# Patient Record
Sex: FEMALE | Race: BLACK OR AFRICAN AMERICAN | NOT HISPANIC OR LATINO | Employment: FULL TIME | URBAN - METROPOLITAN AREA
[De-identification: names, ages, dates, MRNs, and addresses within clinical notes are randomized per-mention and may not be internally consistent; named-entity substitution may affect disease eponyms.]

---

## 2017-03-16 ENCOUNTER — HISTORICAL (OUTPATIENT)
Dept: LAB | Facility: HOSPITAL | Age: 27
End: 2017-03-16

## 2017-10-27 ENCOUNTER — HISTORICAL (OUTPATIENT)
Dept: RADIOLOGY | Facility: HOSPITAL | Age: 27
End: 2017-10-27

## 2018-07-30 ENCOUNTER — HISTORICAL (OUTPATIENT)
Dept: LAB | Facility: HOSPITAL | Age: 28
End: 2018-07-30

## 2018-07-30 LAB
ALBUMIN SERPL-MCNC: 3.5 GM/DL (ref 3.4–5)
ALBUMIN/GLOB SERPL: 0.7 RATIO
ALP SERPL-CCNC: 94 UNIT/L (ref 30–113)
ALT SERPL-CCNC: 25 UNIT/L (ref 10–45)
AST SERPL-CCNC: 18 UNIT/L (ref 15–37)
BILIRUB SERPL-MCNC: 0.7 MG/DL (ref 0.1–0.9)
BILIRUBIN DIRECT+TOT PNL SERPL-MCNC: 0.1 MG/DL (ref 0–0.3)
BILIRUBIN DIRECT+TOT PNL SERPL-MCNC: 0.6 MG/DL
BUN SERPL-MCNC: 9 MG/DL (ref 10–20)
CALCIUM SERPL-MCNC: 9.2 MG/DL (ref 8–10.5)
CHLORIDE SERPL-SCNC: 104 MMOL/L (ref 100–108)
CO2 SERPL-SCNC: 26 MMOL/L (ref 21–35)
CREAT SERPL-MCNC: 0.91 MG/DL (ref 0.7–1.3)
EST. AVERAGE GLUCOSE BLD GHB EST-MCNC: 114 MG/DL
GLOBULIN SER-MCNC: 4.7 GM/DL
GLUCOSE SERPL-MCNC: 94 MG/DL (ref 75–116)
HBA1C MFR BLD: 5.6 % (ref 4.8–6)
POTASSIUM SERPL-SCNC: 3.8 MMOL/L (ref 3.6–5.2)
PROT SERPL-MCNC: 8.2 GM/DL (ref 6.4–8.2)
SODIUM SERPL-SCNC: 139 MMOL/L (ref 135–145)

## 2019-10-14 ENCOUNTER — HISTORICAL (OUTPATIENT)
Dept: LAB | Facility: HOSPITAL | Age: 29
End: 2019-10-14

## 2019-10-14 LAB
ABS NEUT (OLG): 2.6 X10(3)/MCL (ref 1.5–6.9)
ALBUMIN SERPL-MCNC: 3.3 GM/DL (ref 3.4–5)
ALBUMIN/GLOB SERPL: 0.7 RATIO
ALP SERPL-CCNC: 102 UNIT/L (ref 30–113)
ALT SERPL-CCNC: 18 UNIT/L (ref 10–45)
APPEARANCE, UA: CLEAR
AST SERPL-CCNC: 9 UNIT/L (ref 15–37)
BACTERIA SPEC CULT: ABNORMAL /HPF
BILIRUB SERPL-MCNC: 0.4 MG/DL (ref 0.1–0.9)
BILIRUB UR QL STRIP: NEGATIVE
BILIRUBIN DIRECT+TOT PNL SERPL-MCNC: 0.1 MG/DL (ref 0–0.3)
BILIRUBIN DIRECT+TOT PNL SERPL-MCNC: 0.3 MG/DL
BUN SERPL-MCNC: 12 MG/DL (ref 10–20)
CALCIUM SERPL-MCNC: 8.9 MG/DL (ref 8–10.5)
CHLORIDE SERPL-SCNC: 102 MMOL/L (ref 100–108)
CO2 SERPL-SCNC: 30 MMOL/L (ref 21–35)
COLOR UR: ABNORMAL
CREAT SERPL-MCNC: 1.02 MG/DL (ref 0.7–1.3)
DEPRECATED CALCIDIOL+CALCIFEROL SERPL-MC: 18.5 NG/ML (ref 30–80)
ERYTHROCYTE [DISTWIDTH] IN BLOOD BY AUTOMATED COUNT: 14.1 % (ref 11.5–17)
EST. AVERAGE GLUCOSE BLD GHB EST-MCNC: 140 MG/DL
GLOBULIN SER-MCNC: 4.5 GM/DL
GLUCOSE (UA): NEGATIVE
GLUCOSE SERPL-MCNC: 107 MG/DL (ref 75–116)
HBA1C MFR BLD: 6.5 % (ref 4.8–6)
HCT VFR BLD AUTO: 37.3 % (ref 36–48)
HGB BLD-MCNC: 11.3 GM/DL (ref 12–16)
HGB UR QL STRIP: NEGATIVE
KETONES UR QL STRIP: NEGATIVE
LEUKOCYTE ESTERASE UR QL STRIP: ABNORMAL
MCH RBC QN AUTO: 25 PG (ref 27–34)
MCHC RBC AUTO-ENTMCNC: 30 GM/DL (ref 31–36)
MCV RBC AUTO: 83 FL (ref 80–99)
NITRITE UR QL STRIP: NEGATIVE
PH UR STRIP: 6 [PH]
PLATELET # BLD AUTO: 328 X10(3)/MCL (ref 140–400)
PMV BLD AUTO: 9.4 FL (ref 6.8–10)
POTASSIUM SERPL-SCNC: 3.5 MMOL/L (ref 3.6–5.2)
PROT SERPL-MCNC: 7.8 GM/DL (ref 6.4–8.2)
PROT UR QL STRIP: NEGATIVE
RBC # BLD AUTO: 4.47 X10(6)/MCL (ref 4.2–5.4)
RBC #/AREA URNS HPF: ABNORMAL /[HPF]
SODIUM SERPL-SCNC: 140 MMOL/L (ref 135–145)
SP GR UR STRIP: 1.02
SQUAMOUS EPITHELIAL, UA: ABNORMAL /LPF
TSH SERPL-ACNC: 1.81 MIU/ML (ref 0.36–3.74)
UROBILINOGEN UR STRIP-ACNC: 0.2 EU/DL
WBC # SPEC AUTO: 5.4 X10(3)/MCL (ref 4.5–11.5)
WBC #/AREA URNS HPF: ABNORMAL /HPF

## 2019-10-15 LAB — FINAL CULTURE: NORMAL

## 2020-07-06 ENCOUNTER — HISTORICAL (OUTPATIENT)
Dept: ADMINISTRATIVE | Facility: HOSPITAL | Age: 30
End: 2020-07-06

## 2020-07-06 LAB
ABS NEUT (OLG): 2.6 X10(3)/MCL (ref 2.1–9.2)
ALBUMIN SERPL-MCNC: 3.4 GM/DL (ref 3.4–5)
ALBUMIN/GLOB SERPL: 0.7 RATIO (ref 1.1–2)
ALP SERPL-CCNC: 91 UNIT/L (ref 45–117)
ALT SERPL-CCNC: 25 UNIT/L (ref 12–78)
AST SERPL-CCNC: 8 UNIT/L (ref 15–37)
BASOPHILS # BLD AUTO: 0 X10(3)/MCL (ref 0–0.2)
BASOPHILS NFR BLD AUTO: 0 %
BILIRUB SERPL-MCNC: 0.4 MG/DL (ref 0.2–1)
BILIRUBIN DIRECT+TOT PNL SERPL-MCNC: <0.1 MG/DL (ref 0–0.2)
BILIRUBIN DIRECT+TOT PNL SERPL-MCNC: ABNORMAL MG/DL
BUN SERPL-MCNC: 12 MG/DL (ref 7–18)
CALCIUM SERPL-MCNC: 9.1 MG/DL (ref 8.5–10.1)
CHLORIDE SERPL-SCNC: 102 MMOL/L (ref 98–107)
CHOLEST SERPL-MCNC: 174 MG/DL
CHOLEST/HDLC SERPL: 6.2 {RATIO} (ref 0–4.4)
CO2 SERPL-SCNC: 29 MMOL/L (ref 21–32)
CREAT SERPL-MCNC: 0.9 MG/DL (ref 0.6–1.3)
DEPRECATED CALCIDIOL+CALCIFEROL SERPL-MC: 14.9 NG/ML (ref 30–80)
EOSINOPHIL # BLD AUTO: 0.1 X10(3)/MCL (ref 0–0.9)
EOSINOPHIL NFR BLD AUTO: 3 %
ERYTHROCYTE [DISTWIDTH] IN BLOOD BY AUTOMATED COUNT: 15.7 % (ref 11.5–14.5)
EST. AVERAGE GLUCOSE BLD GHB EST-MCNC: 120 MG/DL
GLOBULIN SER-MCNC: 4.6 GM/ML (ref 2.3–3.5)
GLUCOSE SERPL-MCNC: 116 MG/DL (ref 74–106)
HBA1C MFR BLD: 5.8 % (ref 4.2–6.3)
HCT VFR BLD AUTO: 37.8 % (ref 35–46)
HDLC SERPL-MCNC: 28 MG/DL (ref 40–59)
HGB BLD-MCNC: 11.4 GM/DL (ref 12–16)
IMM GRANULOCYTES # BLD AUTO: 0.02 10*3/UL
IMM GRANULOCYTES NFR BLD AUTO: 0 %
LDLC SERPL CALC-MCNC: 75 MG/DL
LYMPHOCYTES # BLD AUTO: 2.2 X10(3)/MCL (ref 0.6–4.6)
LYMPHOCYTES NFR BLD AUTO: 40 %
MCH RBC QN AUTO: 24.6 PG (ref 26–34)
MCHC RBC AUTO-ENTMCNC: 30.2 GM/DL (ref 31–37)
MCV RBC AUTO: 81.6 FL (ref 80–100)
MONOCYTES # BLD AUTO: 0.5 X10(3)/MCL (ref 0.1–1.3)
MONOCYTES NFR BLD AUTO: 8 %
NEUTROPHILS # BLD AUTO: 2.6 X10(3)/MCL (ref 2.1–9.2)
NEUTROPHILS NFR BLD AUTO: 48 %
PLATELET # BLD AUTO: 380 X10(3)/MCL (ref 130–400)
PMV BLD AUTO: 9.7 FL (ref 7.4–10.4)
POTASSIUM SERPL-SCNC: 3.4 MMOL/L (ref 3.5–5.1)
PROT SERPL-MCNC: 8 GM/DL (ref 6.4–8.2)
RBC # BLD AUTO: 4.63 X10(6)/MCL (ref 4–5.2)
SODIUM SERPL-SCNC: 137 MMOL/L (ref 136–145)
T4 FREE SERPL-MCNC: 1.24 NG/DL (ref 0.76–1.46)
TRIGL SERPL-MCNC: 356 MG/DL
TSH SERPL-ACNC: 1.52 MIU/L (ref 0.36–3.74)
VLDLC SERPL CALC-MCNC: 71 MG/DL
WBC # SPEC AUTO: 5.4 X10(3)/MCL (ref 4.5–11)

## 2020-07-27 ENCOUNTER — HISTORICAL (OUTPATIENT)
Dept: ADMINISTRATIVE | Facility: HOSPITAL | Age: 30
End: 2020-07-27

## 2020-10-23 ENCOUNTER — HISTORICAL (OUTPATIENT)
Dept: LAB | Facility: HOSPITAL | Age: 30
End: 2020-10-23

## 2020-10-23 LAB
ABS NEUT (OLG): 3.1 X10(3)/MCL (ref 1.5–6.9)
ALBUMIN SERPL-MCNC: 3.5 GM/DL (ref 3.5–5)
ALBUMIN/GLOB SERPL: 0.8 RATIO (ref 1.1–2)
ALP SERPL-CCNC: 60 UNIT/L (ref 40–150)
ALT SERPL-CCNC: 13 UNIT/L (ref 0–55)
APPEARANCE, UA: ABNORMAL
AST SERPL-CCNC: 16 UNIT/L (ref 5–34)
BACTERIA SPEC CULT: ABNORMAL /HPF
BILIRUB SERPL-MCNC: 0.4 MG/DL
BILIRUB UR QL STRIP: NEGATIVE
BILIRUBIN DIRECT+TOT PNL SERPL-MCNC: 0.2 MG/DL (ref 0–0.5)
BILIRUBIN DIRECT+TOT PNL SERPL-MCNC: 0.2 MG/DL (ref 0–0.8)
BUN SERPL-MCNC: 11 MG/DL (ref 7–18.7)
CALCIUM SERPL-MCNC: 9.8 MG/DL (ref 8.4–10.2)
CHLORIDE SERPL-SCNC: 104 MMOL/L (ref 98–107)
CO2 SERPL-SCNC: 23 MMOL/L (ref 22–29)
COLOR UR: YELLOW
CREAT SERPL-MCNC: 0.85 MG/DL (ref 0.55–1.02)
DEPRECATED CALCIDIOL+CALCIFEROL SERPL-MC: 26.1 NG/ML (ref 6.6–49.9)
ERYTHROCYTE [DISTWIDTH] IN BLOOD BY AUTOMATED COUNT: 14.5 % (ref 11.5–17)
EST. AVERAGE GLUCOSE BLD GHB EST-MCNC: 114 MG/DL
GLOBULIN SER-MCNC: 4.4 GM/DL (ref 2.4–3.5)
GLUCOSE (UA): NEGATIVE MG/DL
GLUCOSE SERPL-MCNC: 95 MG/DL (ref 74–100)
HBA1C MFR BLD: 5.6 %
HCT VFR BLD AUTO: 35.8 % (ref 36–48)
HGB BLD-MCNC: 10.8 GM/DL (ref 12–16)
HGB UR QL STRIP: NEGATIVE
KETONES UR QL STRIP: NEGATIVE MG/DL
LEUKOCYTE ESTERASE UR QL STRIP: ABNORMAL
MCH RBC QN AUTO: 24 PG (ref 27–34)
MCHC RBC AUTO-ENTMCNC: 30 GM/DL (ref 31–36)
MCV RBC AUTO: 80 FL (ref 80–99)
NITRITE UR QL STRIP: NEGATIVE
PH UR STRIP: 6 [PH] (ref 4.6–8)
PLATELET # BLD AUTO: 383 X10(3)/MCL (ref 140–400)
PMV BLD AUTO: 9.9 FL (ref 6.8–10)
POTASSIUM SERPL-SCNC: 4.2 MMOL/L (ref 3.5–5.1)
PROT SERPL-MCNC: 7.9 GM/DL (ref 6.4–8.3)
PROT UR QL STRIP: 30 MG/DL
RBC # BLD AUTO: 4.48 X10(6)/MCL (ref 4.2–5.4)
RBC #/AREA URNS HPF: ABNORMAL /[HPF]
SODIUM SERPL-SCNC: 138 MMOL/L (ref 136–145)
SP GR UR STRIP: 1.02 (ref 1–1.03)
SQUAMOUS EPITHELIAL, UA: ABNORMAL /LPF
TSH SERPL-ACNC: 1.53 UIU/ML (ref 0.35–4.94)
UROBILINOGEN UR STRIP-ACNC: 0.2 EU/DL
WBC # SPEC AUTO: 5.6 X10(3)/MCL (ref 4.5–11.5)
WBC #/AREA URNS HPF: ABNORMAL /HPF

## 2020-10-25 LAB — FINAL CULTURE: NORMAL

## 2021-09-10 LAB
BILIRUB SERPL-MCNC: NEGATIVE MG/DL
BLOOD URINE, POC: NEGATIVE
CLARITY, POC UA: CLEAR
COLOR, POC UA: YELLOW
GLUCOSE UR QL STRIP: NEGATIVE
KETONES UR QL STRIP: NEGATIVE
LEUKOCYTE EST, POC UA: NORMAL
NITRITE, POC UA: NEGATIVE
PH, POC UA: 5.5
POC BETA-HCG (QUAL): NEGATIVE
PROTEIN, POC: NEGATIVE
SPECIFIC GRAVITY, POC UA: 1.03
UROBILINOGEN, POC UA: NORMAL

## 2021-09-20 ENCOUNTER — HISTORICAL (OUTPATIENT)
Dept: ADMINISTRATIVE | Facility: HOSPITAL | Age: 31
End: 2021-09-20

## 2021-09-20 LAB
ERYTHROCYTE [DISTWIDTH] IN BLOOD BY AUTOMATED COUNT: 16.7 % (ref 11–14.5)
EST. AVERAGE GLUCOSE BLD GHB EST-MCNC: 102 MG/DL (ref 70–115)
HBA1C MFR BLD: 5.4 % (ref 4–6)
HCT VFR BLD AUTO: 32.3 % (ref 36–48)
HGB BLD-MCNC: 9.7 G/DL (ref 11.8–16)
MCH RBC QN AUTO: 22.9 PG (ref 27–34)
MCHC RBC AUTO-ENTMCNC: 30 G/DL (ref 31–37)
MCV RBC AUTO: 76.4 FL (ref 79–99)
PLATELET # BLD AUTO: 386 X10(3)/MCL (ref 140–371)
PMV BLD AUTO: 9.1 FL (ref 9.4–12.4)
RBC # BLD AUTO: 4.23 X10(6)/MCL (ref 4–5.1)
T4 FREE SERPL-MCNC: 1.15 NG/DL (ref 0.78–2.19)
TSH SERPL-ACNC: 1.23 UIU/ML (ref 0.36–3.74)
WBC # SPEC AUTO: 5.3 X10(3)/MCL (ref 4–11.5)

## 2021-09-30 LAB — HUMAN PAPILLOMAVIRUS (HPV): NORMAL

## 2021-11-05 LAB — POC BETA-HCG (QUAL): NEGATIVE

## 2021-12-06 ENCOUNTER — HISTORICAL (OUTPATIENT)
Dept: ADMINISTRATIVE | Facility: HOSPITAL | Age: 31
End: 2021-12-06

## 2021-12-07 LAB — GRAM STN SPEC: NORMAL

## 2021-12-30 ENCOUNTER — HISTORICAL (OUTPATIENT)
Dept: ADMINISTRATIVE | Facility: HOSPITAL | Age: 31
End: 2021-12-30

## 2022-01-10 LAB — FINAL CULTURE: NORMAL

## 2022-04-10 ENCOUNTER — HISTORICAL (OUTPATIENT)
Dept: ADMINISTRATIVE | Facility: HOSPITAL | Age: 32
End: 2022-04-10

## 2022-04-11 ENCOUNTER — HISTORICAL (OUTPATIENT)
Dept: ADMINISTRATIVE | Facility: HOSPITAL | Age: 32
End: 2022-04-11

## 2022-04-28 VITALS
DIASTOLIC BLOOD PRESSURE: 88 MMHG | BODY MASS INDEX: 41.07 KG/M2 | SYSTOLIC BLOOD PRESSURE: 122 MMHG | HEIGHT: 69 IN | WEIGHT: 277.31 LBS

## 2022-04-28 VITALS
DIASTOLIC BLOOD PRESSURE: 88 MMHG | HEIGHT: 69 IN | SYSTOLIC BLOOD PRESSURE: 122 MMHG | BODY MASS INDEX: 41.07 KG/M2 | WEIGHT: 277.31 LBS

## 2022-05-03 ENCOUNTER — HISTORICAL (OUTPATIENT)
Dept: ADMINISTRATIVE | Facility: HOSPITAL | Age: 32
End: 2022-05-03

## 2022-06-24 ENCOUNTER — LAB VISIT (OUTPATIENT)
Dept: LAB | Facility: HOSPITAL | Age: 32
End: 2022-06-24
Attending: FAMILY MEDICINE
Payer: MEDICAID

## 2022-06-24 DIAGNOSIS — I10 HYPERTENSION, UNSPECIFIED TYPE: ICD-10-CM

## 2022-06-24 DIAGNOSIS — E11.51 TYPE II DIABETES MELLITUS WITH PERIPHERAL CIRCULATORY DISORDER: ICD-10-CM

## 2022-06-24 DIAGNOSIS — E78.00 HIGH CHOLESTEROL: Primary | ICD-10-CM

## 2022-06-24 LAB
ALBUMIN SERPL-MCNC: 3.4 GM/DL (ref 3.5–5)
ALBUMIN/GLOB SERPL: 0.9 RATIO (ref 1.1–2)
ALP SERPL-CCNC: 57 UNIT/L (ref 40–150)
ALT SERPL-CCNC: 25 UNIT/L (ref 0–55)
APPEARANCE UR: CLEAR
AST SERPL-CCNC: 20 UNIT/L (ref 5–34)
BACTERIA #/AREA URNS AUTO: ABNORMAL /HPF
BILIRUB UR QL STRIP.AUTO: NEGATIVE MG/DL
BILIRUBIN DIRECT+TOT PNL SERPL-MCNC: 0.6 MG/DL
BNP BLD-MCNC: <10 PG/ML
BUN SERPL-MCNC: 10 MG/DL (ref 7–18.7)
CALCIUM SERPL-MCNC: 9.2 MG/DL (ref 8.4–10.2)
CHLORIDE SERPL-SCNC: 101 MMOL/L (ref 98–107)
CHOLEST SERPL-MCNC: 162 MG/DL
CHOLEST/HDLC SERPL: 5 {RATIO} (ref 0–5)
CO2 SERPL-SCNC: 28 MMOL/L (ref 22–29)
COLOR UR AUTO: YELLOW
CREAT SERPL-MCNC: 0.84 MG/DL (ref 0.55–1.02)
DEPRECATED CALCIDIOL+CALCIFEROL SERPL-MC: 12.7 NG/ML (ref 30–80)
ERYTHROCYTE [DISTWIDTH] IN BLOOD BY AUTOMATED COUNT: 13.2 % (ref 11.5–17)
GLOBULIN SER-MCNC: 3.8 GM/DL (ref 2.4–3.5)
GLUCOSE SERPL-MCNC: 112 MG/DL (ref 74–100)
GLUCOSE UR QL STRIP.AUTO: NEGATIVE MG/DL
HCT VFR BLD AUTO: 39.6 % (ref 37–47)
HDLC SERPL-MCNC: 30 MG/DL (ref 35–60)
HGB BLD-MCNC: 13 GM/DL (ref 12–16)
KETONES UR QL STRIP.AUTO: NEGATIVE MG/DL
LDLC SERPL CALC-MCNC: 89 MG/DL (ref 50–140)
LEUKOCYTE ESTERASE UR QL STRIP.AUTO: ABNORMAL UNIT/L
MCH RBC QN AUTO: 29.8 PG (ref 27–31)
MCHC RBC AUTO-ENTMCNC: 32.8 MG/DL (ref 33–36)
MCV RBC AUTO: 90.8 FL (ref 80–94)
NITRITE UR QL STRIP.AUTO: NEGATIVE
PH UR STRIP.AUTO: 5.5 [PH]
PLATELET # BLD AUTO: 296 X10(3)/MCL (ref 130–400)
PMV BLD AUTO: 9.7 FL (ref 9.4–12.4)
POTASSIUM SERPL-SCNC: 3.7 MMOL/L (ref 3.5–5.1)
PROT SERPL-MCNC: 7.2 GM/DL (ref 6.4–8.3)
PROT UR QL STRIP.AUTO: NEGATIVE MG/DL
RBC # BLD AUTO: 4.36 X10(6)/MCL (ref 4.2–5.4)
RBC #/AREA URNS AUTO: ABNORMAL /HPF
RBC UR QL AUTO: NEGATIVE UNIT/L
SODIUM SERPL-SCNC: 138 MMOL/L (ref 136–145)
SP GR UR STRIP.AUTO: 1.02
SQUAMOUS #/AREA URNS AUTO: ABNORMAL /HPF
TRIGL SERPL-MCNC: 217 MG/DL (ref 37–140)
TSH SERPL-ACNC: 0.48 UIU/ML (ref 0.35–4.94)
UROBILINOGEN UR STRIP-ACNC: 0.2 MG/DL
VLDLC SERPL CALC-MCNC: 43 MG/DL
WBC # SPEC AUTO: 5.7 X10(3)/MCL (ref 4.5–11.5)
WBC #/AREA URNS AUTO: ABNORMAL /HPF

## 2022-06-24 PROCEDURE — 82306 VITAMIN D 25 HYDROXY: CPT

## 2022-06-24 PROCEDURE — 80053 COMPREHEN METABOLIC PANEL: CPT

## 2022-06-24 PROCEDURE — 83880 ASSAY OF NATRIURETIC PEPTIDE: CPT

## 2022-06-24 PROCEDURE — 84443 ASSAY THYROID STIM HORMONE: CPT

## 2022-06-24 PROCEDURE — 80061 LIPID PANEL: CPT

## 2022-06-24 PROCEDURE — 81001 URINALYSIS AUTO W/SCOPE: CPT

## 2022-06-24 PROCEDURE — 85027 COMPLETE CBC AUTOMATED: CPT

## 2022-06-24 PROCEDURE — 36415 COLL VENOUS BLD VENIPUNCTURE: CPT

## 2022-07-10 ENCOUNTER — HOSPITAL ENCOUNTER (EMERGENCY)
Facility: HOSPITAL | Age: 32
Discharge: HOME | End: 2022-07-10
Attending: INTERNAL MEDICINE
Payer: MEDICAID

## 2022-07-10 VITALS
RESPIRATION RATE: 18 BRPM | WEIGHT: 281.81 LBS | BODY MASS INDEX: 44.23 KG/M2 | DIASTOLIC BLOOD PRESSURE: 94 MMHG | HEIGHT: 67 IN | TEMPERATURE: 99 F | SYSTOLIC BLOOD PRESSURE: 152 MMHG | OXYGEN SATURATION: 100 % | HEART RATE: 88 BPM

## 2022-07-10 DIAGNOSIS — F41.9 ANXIETY: ICD-10-CM

## 2022-07-10 DIAGNOSIS — R07.9 CHEST PAIN, UNSPECIFIED TYPE: Primary | ICD-10-CM

## 2022-07-10 DIAGNOSIS — G47.00 INSOMNIA, UNSPECIFIED TYPE: ICD-10-CM

## 2022-07-10 LAB
AMPHET UR QL SCN: NEGATIVE
APPEARANCE UR: CLEAR
B-HCG SERPL QL: NEGATIVE
BACTERIA #/AREA URNS AUTO: ABNORMAL /HPF
BARBITURATE SCN PRESENT UR: NEGATIVE
BENZODIAZ UR QL SCN: NEGATIVE
BILIRUB UR QL STRIP.AUTO: NEGATIVE MG/DL
CANNABINOIDS UR QL SCN: NEGATIVE
COCAINE UR QL SCN: NEGATIVE
COLOR UR AUTO: YELLOW
FENTANYL UR QL SCN: NEGATIVE
GLUCOSE UR QL STRIP.AUTO: NEGATIVE MG/DL
KETONES UR QL STRIP.AUTO: ABNORMAL MG/DL
LEUKOCYTE ESTERASE UR QL STRIP.AUTO: NEGATIVE UNIT/L
MDMA UR QL SCN: NEGATIVE
NITRITE UR QL STRIP.AUTO: NEGATIVE
OPIATES UR QL SCN: NEGATIVE
PCP UR QL: NEGATIVE
PH UR STRIP.AUTO: 5.5 [PH]
PH UR: 5.5 [PH] (ref 3–11)
PROT UR QL STRIP.AUTO: NEGATIVE MG/DL
RBC #/AREA URNS AUTO: ABNORMAL /HPF
RBC UR QL AUTO: ABNORMAL UNIT/L
SP GR UR STRIP.AUTO: >=1.03
SPECIFIC GRAVITY, URINE AUTO (.000) (OHS): >=1.03 (ref 1–1.03)
SQUAMOUS #/AREA URNS AUTO: ABNORMAL /HPF
TROPONIN I SERPL-MCNC: <0.01 NG/ML (ref 0–0.04)
UROBILINOGEN UR STRIP-ACNC: 0.2 MG/DL
WBC #/AREA URNS AUTO: ABNORMAL /HPF

## 2022-07-10 PROCEDURE — 81001 URINALYSIS AUTO W/SCOPE: CPT | Mod: 59 | Performed by: INTERNAL MEDICINE

## 2022-07-10 PROCEDURE — 99285 EMERGENCY DEPT VISIT HI MDM: CPT | Mod: 25

## 2022-07-10 PROCEDURE — 81025 URINE PREGNANCY TEST: CPT | Performed by: INTERNAL MEDICINE

## 2022-07-10 PROCEDURE — 36415 COLL VENOUS BLD VENIPUNCTURE: CPT | Performed by: INTERNAL MEDICINE

## 2022-07-10 PROCEDURE — 80307 DRUG TEST PRSMV CHEM ANLYZR: CPT | Performed by: INTERNAL MEDICINE

## 2022-07-10 PROCEDURE — 84484 ASSAY OF TROPONIN QUANT: CPT | Performed by: INTERNAL MEDICINE

## 2022-07-10 PROCEDURE — 93005 ELECTROCARDIOGRAM TRACING: CPT

## 2022-07-11 NOTE — ED PROVIDER NOTES
Encounter Date: 7/10/2022       History     Chief Complaint   Patient presents with    Chest Pain     Pt states began a few days ago with L sided chest pain with pain to L arm. Anxious.      32-year-old black female presents emergency department with recurrent chest pain on the left side.  She admits to frequent anxiety that has been getting worse recently.  She has seen her primary care and is on some as needed medications but she reports it is not working        Review of patient's allergies indicates:  No Known Allergies  Past Medical History:   Diagnosis Date    Anxiety disorder, unspecified     Hypertension      Past Surgical History:   Procedure Laterality Date    SURGICAL REMOVAL OF BOTH KIDNEYS      TUBAL LIGATION       No family history on file.  Social History     Tobacco Use    Smoking status: Never Smoker    Smokeless tobacco: Never Used     Review of Systems   Constitutional: Negative.  Negative for activity change, appetite change, chills, diaphoresis, fatigue, fever and unexpected weight change.   HENT: Negative.  Negative for congestion, dental problem, drooling, ear discharge, ear pain, facial swelling, hearing loss, mouth sores, nosebleeds, postnasal drip, rhinorrhea, sinus pressure, sinus pain, sneezing, sore throat, tinnitus, trouble swallowing and voice change.    Eyes: Negative.  Negative for photophobia, pain, discharge, redness, itching and visual disturbance.   Respiratory: Positive for chest tightness. Negative for apnea, cough, choking, shortness of breath, wheezing and stridor.    Cardiovascular: Positive for chest pain and palpitations. Negative for leg swelling.   Gastrointestinal: Negative.  Negative for abdominal distention, abdominal pain, anal bleeding, blood in stool, constipation, diarrhea, nausea, rectal pain and vomiting.   Endocrine: Negative.  Negative for cold intolerance, heat intolerance, polydipsia, polyphagia and polyuria.   Genitourinary: Negative.  Negative for  decreased urine volume, difficulty urinating, dyspareunia, dysuria, enuresis, flank pain, frequency, genital sores, hematuria, menstrual problem, pelvic pain, urgency, vaginal bleeding, vaginal discharge and vaginal pain.   Musculoskeletal: Negative.  Negative for arthralgias, back pain, gait problem, joint swelling, myalgias, neck pain and neck stiffness.   Skin: Negative.  Negative for color change, pallor, rash and wound.   Allergic/Immunologic: Negative.  Negative for environmental allergies, food allergies and immunocompromised state.   Neurological: Negative.  Negative for dizziness, tremors, seizures, syncope, facial asymmetry, speech difficulty, weakness, light-headedness, numbness and headaches.   Hematological: Negative.  Negative for adenopathy. Does not bruise/bleed easily.   Psychiatric/Behavioral: Negative.  Negative for agitation, behavioral problems, confusion, decreased concentration, dysphoric mood, hallucinations, self-injury, sleep disturbance and suicidal ideas. The patient is not nervous/anxious and is not hyperactive.    All other systems reviewed and are negative.      Physical Exam     Initial Vitals [07/10/22 1904]   BP Pulse Resp Temp SpO2   (!) 163/105 92 18 98.8 °F (37.1 °C) 100 %      MAP       --         Physical Exam    Nursing note and vitals reviewed.  Constitutional: She appears well-developed and well-nourished. She is not diaphoretic. No distress.   HENT:   Head: Normocephalic and atraumatic.   Mouth/Throat: Oropharynx is clear and moist. No oropharyngeal exudate.   Eyes: Conjunctivae and EOM are normal. Pupils are equal, round, and reactive to light. No scleral icterus.   Neck: Neck supple. No JVD present.   Normal range of motion.  Cardiovascular: Normal rate, regular rhythm, normal heart sounds and intact distal pulses. Exam reveals no gallop and no friction rub.    No murmur heard.  Pulmonary/Chest: Breath sounds normal. No respiratory distress. She has no wheezes. She  exhibits no tenderness.   Abdominal: Abdomen is soft. Bowel sounds are normal. She exhibits no distension. There is no abdominal tenderness. There is no rebound.   Musculoskeletal:         General: Normal range of motion.      Cervical back: Normal range of motion and neck supple.     Neurological: She is alert and oriented to person, place, and time. She has normal strength and normal reflexes.   Skin: Skin is warm and dry. Capillary refill takes less than 2 seconds.   Psychiatric: She has a normal mood and affect. Her behavior is normal. Judgment and thought content normal.   Anxious         ED Course   Procedures   Admission on 07/10/2022   Component Date Value Ref Range Status    Troponin-I 07/10/2022 <0.010  0.000 - 0.045 ng/mL Final    Beta hCG Qualitative, Urine 07/10/2022 Negative  Negative Final    Amphetamines, Urine 07/10/2022 Negative  Negative Final    Barbituates, Urine 07/10/2022 Negative  Negative Final    Benzodiazepine, Urine 07/10/2022 Negative  Negative Final    Cannabinoids, Urine 07/10/2022 Negative  Negative Final    Cocaine, Urine 07/10/2022 Negative  Negative Final    Fentanyl, Urine 07/10/2022 Negative  Negative Final    MDMA, Urine 07/10/2022 Negative  Negative Final    Opiates, Urine 07/10/2022 Negative  Negative Final    Phencyclidine, Urine 07/10/2022 Negative  Negative Final    pH, Urine 07/10/2022 5.5  3.0 - 11.0 Final    Specific Gravity, Urine Auto 07/10/2022 >=1.030  1.001 - 1.035 Final    Color, UA 07/10/2022 Yellow  Yellow, Colorless, Other, Clear Final    Appearance, UA 07/10/2022 Clear  Clear Final    Specific Gravity, UA 07/10/2022 >=1.030   Final    pH, UA 07/10/2022 5.5  5.0, 5.5, 6.0, 6.5, 7.0, 7.5, 8.0, 8.5 Final    Protein, UA 07/10/2022 Negative  Negative, 300  mg/dL Final    Glucose, UA 07/10/2022 Negative  Negative, Normal mg/dL Final    Ketones, UA 07/10/2022 Trace (A) Negative, +1, +2, +3, +4, +5, >=160, >=80 mg/dL Final    Blood, UA 07/10/2022  Trace-Intact (A) Negative unit/L Final    Bilirubin, UA 07/10/2022 Negative  Negative mg/dL Final    Urobilinogen, UA 07/10/2022 0.2  0.2, 1.0, Normal mg/dL Final    Nitrites, UA 07/10/2022 Negative  Negative Final    Leukocyte Esterase, UA 07/10/2022 Negative  Negative, 75  unit/L Final    Bacteria, UA 07/10/2022 Few (A) None Seen, Rare, Occasional /HPF Final    RBC, UA 07/10/2022 0-2  None Seen, 0-2, 3-5, 0-5 /HPF Final    WBC, UA 07/10/2022 None Seen  None Seen, 0-2, 3-5, 0-5 /HPF Final    Squamous Epithelial Cells, UA 07/10/2022 Moderate (A) None Seen, Rare, Occasional, Occ /HPF Final       Labs Reviewed   URINALYSIS, REFLEX TO URINE CULTURE - Abnormal; Notable for the following components:       Result Value    Ketones, UA Trace (*)     Blood, UA Trace-Intact (*)     All other components within normal limits   URINALYSIS, MICROSCOPIC - Abnormal; Notable for the following components:    Bacteria, UA Few (*)     Squamous Epithelial Cells, UA Moderate (*)     All other components within normal limits   TROPONIN I - Normal   PREGNANCY TEST, URINE RAPID - Normal   DRUG SCREEN, URINE (BEAKER) - Normal    Narrative:     Cut off concentrations:    Amphetamines - 1000 ng/ml  Barbiturates - 200 ng/ml  Benzodiazepine - 200 ng/ml  Cannabinoids (THC) - 50 ng/ml  Cocaine - 300 ng/ml  Fentanyl - 1.0 ng/ml  MDMA - 500 ng/ml  Opiates - 300 ng/ml   Phencyclidine (PCP) - 25 ng/ml    Specimen submitted for drug analysis and tested for pH and specific gravity in order to evaluate sample integrity. Suspect tampering if specific gravity is <1.003 and/or pH is not within the range of 4.5 - 8.0  False negatives may result form substances such as bleach added to urine.  False positives may result for the presence of a substance with similar chemical structure to the drug or its metabolite.    This test provides only a PRELIMINARY analytical test result. A more specific alternate chemical method must be used in order to obtain a  confirmed analytical result. Gas chromatography/mass spectrometry (GC/MS) is the preferred confirmatory method. Other chemical confirmation methods are available. Clinical consideration and professional judgement should be applied to any drug of abuse test result, particularly when preliminary positive results are used.    Positive results will be confirmed only at the physicians request. Unconfirmed screening results are to be used only for medical purposes (treatment).          EKG Readings: (Independently Interpreted)   EKG done at 7:08 p.m. shows normal EKG, normal sinus rhythm with a rate of 96 beats per minute       Imaging Results          X-Ray Chest PA And Lateral (Preliminary result)  Result time 07/10/22 20:52:53    ED Interpretation by Hamilton Ku MD (07/10/22 20:52:53, [facility] - Emergency Dept, Emergency Medicine)    No acute cardiopulmonary changes noted                               Medications - No data to display                       Clinical Impression:   Final diagnoses:  [R07.9] Chest pain, unspecified type (Primary)  [F41.9] Anxiety  [G47.00] Insomnia, unspecified type          ED Disposition Condition    Discharge Stable        ED Prescriptions     None        Follow-up Information     Follow up With Specialties Details Why Contact Info    Darryl August MD Family Medicine In 3 days  [address]  [place] [address]  [phone]        Mary Negro is a certified MA and was present during the entire interaction with this patient     Hamilton Ku MD  07/10/22 [id number]

## 2022-07-19 ENCOUNTER — OFFICE VISIT (OUTPATIENT)
Dept: FAMILY MEDICINE | Facility: CLINIC | Age: 32
End: 2022-07-19
Payer: MEDICAID

## 2022-07-19 VITALS
HEIGHT: 67 IN | WEIGHT: 278.88 LBS | SYSTOLIC BLOOD PRESSURE: 138 MMHG | TEMPERATURE: 99 F | HEART RATE: 79 BPM | BODY MASS INDEX: 43.77 KG/M2 | RESPIRATION RATE: 20 BRPM | DIASTOLIC BLOOD PRESSURE: 92 MMHG | OXYGEN SATURATION: 96 %

## 2022-07-19 DIAGNOSIS — I10 PRIMARY HYPERTENSION: ICD-10-CM

## 2022-07-19 DIAGNOSIS — F32.A ANXIETY AND DEPRESSION: ICD-10-CM

## 2022-07-19 DIAGNOSIS — E11.9 TYPE 2 DIABETES MELLITUS WITHOUT COMPLICATION, WITHOUT LONG-TERM CURRENT USE OF INSULIN: Primary | ICD-10-CM

## 2022-07-19 DIAGNOSIS — Z90.5 SOLITARY KIDNEY, ACQUIRED: ICD-10-CM

## 2022-07-19 DIAGNOSIS — F41.9 ANXIETY AND DEPRESSION: ICD-10-CM

## 2022-07-19 LAB
ALBUMIN SERPL-MCNC: 3.7 GM/DL (ref 3.5–5)
ALBUMIN/GLOB SERPL: 1 RATIO (ref 1.1–2)
ALP SERPL-CCNC: 67 UNIT/L (ref 40–150)
ALT SERPL-CCNC: 33 UNIT/L (ref 0–55)
AST SERPL-CCNC: 30 UNIT/L (ref 5–34)
BILIRUBIN DIRECT+TOT PNL SERPL-MCNC: 0.6 MG/DL
BUN SERPL-MCNC: 11.5 MG/DL (ref 7–18.7)
CALCIUM SERPL-MCNC: 9.5 MG/DL (ref 8.4–10.2)
CHLORIDE SERPL-SCNC: 103 MMOL/L (ref 98–107)
CO2 SERPL-SCNC: 28 MMOL/L (ref 22–29)
CREAT SERPL-MCNC: 0.74 MG/DL (ref 0.55–1.02)
EST. AVERAGE GLUCOSE BLD GHB EST-MCNC: 116.9 MG/DL
GLOBULIN SER-MCNC: 3.7 GM/DL (ref 2.4–3.5)
GLUCOSE SERPL-MCNC: 96 MG/DL (ref 74–100)
HBA1C MFR BLD: 5.7 %
POTASSIUM SERPL-SCNC: 3.9 MMOL/L (ref 3.5–5.1)
PROT SERPL-MCNC: 7.4 GM/DL (ref 6.4–8.3)
SODIUM SERPL-SCNC: 139 MMOL/L (ref 136–145)

## 2022-07-19 PROCEDURE — 80053 COMPREHEN METABOLIC PANEL: CPT | Performed by: STUDENT IN AN ORGANIZED HEALTH CARE EDUCATION/TRAINING PROGRAM

## 2022-07-19 PROCEDURE — 3075F SYST BP GE 130 - 139MM HG: CPT | Mod: CPTII,,, | Performed by: STUDENT IN AN ORGANIZED HEALTH CARE EDUCATION/TRAINING PROGRAM

## 2022-07-19 PROCEDURE — 3008F BODY MASS INDEX DOCD: CPT | Mod: CPTII,,, | Performed by: STUDENT IN AN ORGANIZED HEALTH CARE EDUCATION/TRAINING PROGRAM

## 2022-07-19 PROCEDURE — 3008F PR BODY MASS INDEX (BMI) DOCUMENTED: ICD-10-PCS | Mod: CPTII,,, | Performed by: STUDENT IN AN ORGANIZED HEALTH CARE EDUCATION/TRAINING PROGRAM

## 2022-07-19 PROCEDURE — 3080F DIAST BP >= 90 MM HG: CPT | Mod: CPTII,,, | Performed by: STUDENT IN AN ORGANIZED HEALTH CARE EDUCATION/TRAINING PROGRAM

## 2022-07-19 PROCEDURE — 99214 OFFICE O/P EST MOD 30 MIN: CPT | Mod: PBBFAC,PN | Performed by: STUDENT IN AN ORGANIZED HEALTH CARE EDUCATION/TRAINING PROGRAM

## 2022-07-19 PROCEDURE — 3075F PR MOST RECENT SYSTOLIC BLOOD PRESS GE 130-139MM HG: ICD-10-PCS | Mod: CPTII,,, | Performed by: STUDENT IN AN ORGANIZED HEALTH CARE EDUCATION/TRAINING PROGRAM

## 2022-07-19 PROCEDURE — 99204 PR OFFICE/OUTPT VISIT, NEW, LEVL IV, 45-59 MIN: ICD-10-PCS | Mod: S$PBB,,, | Performed by: STUDENT IN AN ORGANIZED HEALTH CARE EDUCATION/TRAINING PROGRAM

## 2022-07-19 PROCEDURE — 83036 HEMOGLOBIN GLYCOSYLATED A1C: CPT | Performed by: STUDENT IN AN ORGANIZED HEALTH CARE EDUCATION/TRAINING PROGRAM

## 2022-07-19 PROCEDURE — 3080F PR MOST RECENT DIASTOLIC BLOOD PRESSURE >= 90 MM HG: ICD-10-PCS | Mod: CPTII,,, | Performed by: STUDENT IN AN ORGANIZED HEALTH CARE EDUCATION/TRAINING PROGRAM

## 2022-07-19 PROCEDURE — 36415 COLL VENOUS BLD VENIPUNCTURE: CPT | Performed by: STUDENT IN AN ORGANIZED HEALTH CARE EDUCATION/TRAINING PROGRAM

## 2022-07-19 PROCEDURE — 99204 OFFICE O/P NEW MOD 45 MIN: CPT | Mod: S$PBB,,, | Performed by: STUDENT IN AN ORGANIZED HEALTH CARE EDUCATION/TRAINING PROGRAM

## 2022-07-19 RX ORDER — SERTRALINE HYDROCHLORIDE 50 MG/1
TABLET, FILM COATED ORAL
Qty: 30 TABLET | Refills: 0 | Status: SHIPPED | OUTPATIENT
Start: 2022-07-19 | End: 2022-08-09

## 2022-07-19 RX ORDER — AMOXICILLIN 500 MG/1
CAPSULE ORAL
COMMUNITY
Start: 2022-06-29 | End: 2023-02-22

## 2022-07-19 RX ORDER — METFORMIN HYDROCHLORIDE 500 MG/1
500 TABLET ORAL
COMMUNITY
End: 2022-08-09 | Stop reason: SDUPTHER

## 2022-07-19 RX ORDER — DOXYCYCLINE 100 MG/1
100 CAPSULE ORAL DAILY
COMMUNITY
Start: 2022-06-01 | End: 2023-02-22

## 2022-07-19 RX ORDER — CLONIDINE HYDROCHLORIDE 0.1 MG/1
TABLET ORAL
COMMUNITY
End: 2023-02-22

## 2022-07-19 RX ORDER — ASPIRIN 325 MG
50000 TABLET, DELAYED RELEASE (ENTERIC COATED) ORAL
Qty: 12 CAPSULE | Refills: 0 | Status: SHIPPED | OUTPATIENT
Start: 2022-07-19 | End: 2022-10-05

## 2022-07-19 RX ORDER — CLONAZEPAM 0.5 MG/1
0.5 TABLET ORAL
COMMUNITY
Start: 2021-12-09 | End: 2023-02-22

## 2022-07-19 RX ORDER — BENZOYL PEROXIDE 5 G/100G
GEL TOPICAL
COMMUNITY
Start: 2022-06-01 | End: 2023-02-22

## 2022-07-19 RX ORDER — HYDROCHLOROTHIAZIDE 25 MG/1
25 TABLET ORAL
COMMUNITY
End: 2022-08-09

## 2022-07-19 NOTE — PROGRESS NOTES
Subjective:       Patient ID: Luh Chowdary is a 32 y.o. female.    Chief Complaint: Establish Care    HPI     32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 to clinic to establish care was previously seen by  In [place], [place]    Anxiety depression possible PTSD  Reports history of anxiety and depression acutely worsening in July of 2021 when her best friend was found dead unexpectedly  Also reports having 4 miscarriages which were very traumatizing for her  Her doctor had initially prescribed trazodone to help her sleep, Seroquel 25 mg to help her sleep but both made her too tired  To function the next morning also prescribed Wellbutrin  For anxiety and depression also prescribed Klonopin to take  When patient is having panic attacks  Most recent panic attack occurred July 10th in which patient had to go to the hospital for chest pain had negative cardiac workup  No SI or HI     Hypertension  Takes metoprolol ER 25 mg, HCTZ 25 mg, amlodipine 5 mg daily  Reports that she is having leg  Swelling which she attributed to the amlodipine of note patient has a solitary kidney having had 1 surgically removed secondary to infection    Obesity  Has tried phentermine to lose weight in the past    Diabetes  Takes metformin 500 mg daily      Med hx as above  Surgical hx- kidney removal secondary to infection  Family hx- Mother with pancreatic cancer  Social hx- non smoker, very occasional drinker, 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   HENT: Negative for nasal congestion.    Respiratory: Negative for cough, shortness of breath and wheezing.    Cardiovascular: Positive for leg swelling. Negative for chest pain.   Genitourinary: Negative for dysuria.   Integumentary:  Negative for rash and wound.   Neurological: Negative for dizziness and syncope.   Psychiatric/Behavioral: Positive for dysphoric mood and sleep disturbance. The patient is  nervous/anxious.          Objective:      Physical Exam  Constitutional:       General: She is not in acute distress.  Eyes:      General: No scleral icterus.     Extraocular Movements: Extraocular movements intact.      Conjunctiva/sclera: Conjunctivae normal.      Pupils: Pupils are equal, round, and reactive to light.   Cardiovascular:      Rate and Rhythm: Normal rate and regular rhythm.      Heart sounds: No murmur heard.  Pulmonary:      Effort: Pulmonary effort is normal. No respiratory distress.      Breath sounds: No stridor. No wheezing or rhonchi.   Abdominal:      General: Bowel sounds are normal. There is no distension.      Palpations: Abdomen is soft.      Tenderness: There is no abdominal tenderness. There is no guarding.   Musculoskeletal:         General: No swelling. Normal range of motion.   Skin:     General: Skin is warm and dry.      Coloration: Skin is not jaundiced.      Findings: No rash.   Neurological:      Mental Status: She is alert.      Gait: Gait normal.   Psychiatric:         Behavior: Behavior normal.         Thought Content: Thought content normal.         Assessment:       Problem List Items Addressed This Visit    None     Visit Diagnoses     Type 2 diabetes mellitus without complication, without long-term current use of insulin    -  Primary    Relevant Medications    metFORMIN (GLUCOPHAGE) 500 MG tablet    Other Relevant Orders    Hemoglobin A1C    Solitary kidney, acquired        Relevant Orders    Comprehensive Metabolic Panel    Anxiety and depression        Relevant Orders    Ambulatory referral/consult to Psychiatry          Plan:       Problem List Items Addressed This Visit        Psychiatric    Anxiety and depression    Overview     Patient has not had formal evaluation but is amenable due to panic attack and possible PTSD for referral to behavioral health  Patient states she has failed Seroquel 25, trazodone, Wellbutrin  Was given Klonopin but states she does not like  to take this medicine as it makes her excessively   tired  Trying Zoloft 25 with titration to 50           Relevant Medications    sertraline (ZOLOFT) 50 MG tablet    Other Relevant Orders    Ambulatory referral/consult to Psychiatry       Cardiac/Vascular    Primary hypertension    Overview     Continuing metoprolol 25, hydro chlorothiazide  25 amlodipine 5 mg BMP today  Will consider switching patient to hydrochlorothiazide lisinopril secondary              Renal/    Solitary kidney, acquired    Overview     Monitoring BMP today           Relevant Orders    Comprehensive Metabolic Panel       Endocrine    Type 2 diabetes mellitus without complication, without long-term current use of insulin - Primary    Overview     Reviewed previous labs but do not have current A1c  A1c today continue metformin 500 mg daily           Relevant Medications    metFORMIN (GLUCOPHAGE) 500 MG tablet    Other Relevant Orders    Hemoglobin A1C       Follow up in about 1 week (around 7/26/2022) for Lab results and change medications. up

## 2022-08-09 ENCOUNTER — OFFICE VISIT (OUTPATIENT)
Dept: FAMILY MEDICINE | Facility: CLINIC | Age: 32
End: 2022-08-09
Payer: MEDICAID

## 2022-08-09 VITALS
HEART RATE: 91 BPM | SYSTOLIC BLOOD PRESSURE: 131 MMHG | DIASTOLIC BLOOD PRESSURE: 92 MMHG | RESPIRATION RATE: 20 BRPM | WEIGHT: 279.13 LBS | HEIGHT: 67 IN | TEMPERATURE: 98 F | BODY MASS INDEX: 43.81 KG/M2

## 2022-08-09 DIAGNOSIS — E66.01 MORBID OBESITY: ICD-10-CM

## 2022-08-09 DIAGNOSIS — I10 PRIMARY HYPERTENSION: ICD-10-CM

## 2022-08-09 DIAGNOSIS — I10 HYPERTENSION, UNSPECIFIED TYPE: ICD-10-CM

## 2022-08-09 DIAGNOSIS — F32.A ANXIETY AND DEPRESSION: ICD-10-CM

## 2022-08-09 DIAGNOSIS — F41.9 ANXIETY AND DEPRESSION: ICD-10-CM

## 2022-08-09 DIAGNOSIS — E11.9 TYPE 2 DIABETES MELLITUS WITHOUT COMPLICATION, WITHOUT LONG-TERM CURRENT USE OF INSULIN: Primary | ICD-10-CM

## 2022-08-09 PROCEDURE — 4010F PR ACE/ARB THEARPY RXD/TAKEN: ICD-10-PCS | Mod: CPTII,,, | Performed by: STUDENT IN AN ORGANIZED HEALTH CARE EDUCATION/TRAINING PROGRAM

## 2022-08-09 PROCEDURE — 3075F PR MOST RECENT SYSTOLIC BLOOD PRESS GE 130-139MM HG: ICD-10-PCS | Mod: CPTII,,, | Performed by: STUDENT IN AN ORGANIZED HEALTH CARE EDUCATION/TRAINING PROGRAM

## 2022-08-09 PROCEDURE — 3080F PR MOST RECENT DIASTOLIC BLOOD PRESSURE >= 90 MM HG: ICD-10-PCS | Mod: CPTII,,, | Performed by: STUDENT IN AN ORGANIZED HEALTH CARE EDUCATION/TRAINING PROGRAM

## 2022-08-09 PROCEDURE — 4010F ACE/ARB THERAPY RXD/TAKEN: CPT | Mod: CPTII,,, | Performed by: STUDENT IN AN ORGANIZED HEALTH CARE EDUCATION/TRAINING PROGRAM

## 2022-08-09 PROCEDURE — 3008F BODY MASS INDEX DOCD: CPT | Mod: CPTII,,, | Performed by: STUDENT IN AN ORGANIZED HEALTH CARE EDUCATION/TRAINING PROGRAM

## 2022-08-09 PROCEDURE — 3080F DIAST BP >= 90 MM HG: CPT | Mod: CPTII,,, | Performed by: STUDENT IN AN ORGANIZED HEALTH CARE EDUCATION/TRAINING PROGRAM

## 2022-08-09 PROCEDURE — 1159F PR MEDICATION LIST DOCUMENTED IN MEDICAL RECORD: ICD-10-PCS | Mod: CPTII,,, | Performed by: STUDENT IN AN ORGANIZED HEALTH CARE EDUCATION/TRAINING PROGRAM

## 2022-08-09 PROCEDURE — 99214 OFFICE O/P EST MOD 30 MIN: CPT | Mod: S$PBB,,, | Performed by: STUDENT IN AN ORGANIZED HEALTH CARE EDUCATION/TRAINING PROGRAM

## 2022-08-09 PROCEDURE — 1159F MED LIST DOCD IN RCRD: CPT | Mod: CPTII,,, | Performed by: STUDENT IN AN ORGANIZED HEALTH CARE EDUCATION/TRAINING PROGRAM

## 2022-08-09 PROCEDURE — 99214 PR OFFICE/OUTPT VISIT, EST, LEVL IV, 30-39 MIN: ICD-10-PCS | Mod: S$PBB,,, | Performed by: STUDENT IN AN ORGANIZED HEALTH CARE EDUCATION/TRAINING PROGRAM

## 2022-08-09 PROCEDURE — 3008F PR BODY MASS INDEX (BMI) DOCUMENTED: ICD-10-PCS | Mod: CPTII,,, | Performed by: STUDENT IN AN ORGANIZED HEALTH CARE EDUCATION/TRAINING PROGRAM

## 2022-08-09 PROCEDURE — 99214 OFFICE O/P EST MOD 30 MIN: CPT | Mod: PBBFAC,PN | Performed by: STUDENT IN AN ORGANIZED HEALTH CARE EDUCATION/TRAINING PROGRAM

## 2022-08-09 PROCEDURE — 3075F SYST BP GE 130 - 139MM HG: CPT | Mod: CPTII,,, | Performed by: STUDENT IN AN ORGANIZED HEALTH CARE EDUCATION/TRAINING PROGRAM

## 2022-08-09 RX ORDER — LISINOPRIL AND HYDROCHLOROTHIAZIDE 20; 25 MG/1; MG/1
1 TABLET ORAL DAILY
Qty: 30 TABLET | Refills: 11 | Status: SHIPPED | OUTPATIENT
Start: 2022-08-09 | End: 2023-08-14 | Stop reason: SDUPTHER

## 2022-08-09 RX ORDER — SERTRALINE HYDROCHLORIDE 50 MG/1
50 TABLET, FILM COATED ORAL DAILY
Qty: 30 TABLET | Refills: 11 | Status: SHIPPED | OUTPATIENT
Start: 2022-08-09 | End: 2022-09-13

## 2022-08-09 RX ORDER — METOPROLOL SUCCINATE 25 MG/1
25 TABLET, EXTENDED RELEASE ORAL DAILY
Qty: 30 TABLET | Refills: 11 | Status: SHIPPED | OUTPATIENT
Start: 2022-08-09 | End: 2023-08-11 | Stop reason: SDUPTHER

## 2022-08-09 RX ORDER — METFORMIN HYDROCHLORIDE 500 MG/1
500 TABLET ORAL 2 TIMES DAILY WITH MEALS
Qty: 30 TABLET | Refills: 11 | Status: SHIPPED | OUTPATIENT
Start: 2022-08-09 | End: 2023-01-10 | Stop reason: SDUPTHER

## 2022-08-09 NOTE — PROGRESS NOTES
Subjective:       Patient ID: Luh Chowdary is a 32 y.o. female.    Chief Complaint: Follow-up    HPI       32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 to clinic to establish care was previously seen by  In [place], [place]    Anxiety depression possible PTSD  Reports history of anxiety and depression acutely worsening in July of 2021 when her best friend was found dead unexpectedly  Also reports having 4 miscarriages which were very traumatizing for her  Her doctor had initially prescribed trazodone to help her sleep, Seroquel 25 mg to help her sleep but both made her too tired  To function the next morning also prescribed Wellbutrin  For anxiety and depression also prescribed Klonopin to take  When patient is having panic attacks  Most recent panic attack occurred July 10th in which patient had to go to the hospital for chest pain had negative cardiac workup  No SI or HI   Zoloft titrated to 50 mg and sent referral to Dr. 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   HENT: Negative for nasal congestion.    Respiratory: Negative for cough, shortness of breath and wheezing.    Cardiovascular: Positive for leg swelling. Negative for chest pain.   Genitourinary: Negative for dysuria.   Integumentary:  Negative for rash and wound.   Neurological: Negative for dizziness and syncope.   Psychiatric/Behavioral: Positive for dysphoric  mood and sleep disturbance. The patient is nervous/anxious.          Objective:      Physical Exam  Constitutional:       General: She is not in acute distress.  Eyes:      General: No scleral icterus.     Extraocular Movements: Extraocular movements intact.      Conjunctiva/sclera: Conjunctivae normal.      Pupils: Pupils are equal, round, and reactive to light.   Cardiovascular:      Rate and Rhythm: Normal rate and regular rhythm.      Heart sounds: No murmur heard.  Pulmonary:      Effort: Pulmonary effort is normal. No respiratory distress.      Breath sounds: No stridor. No wheezing or rhonchi.   Musculoskeletal:         General: No swelling. Normal range of motion.   Skin:     General: Skin is warm and dry.      Coloration: Skin is not jaundiced.      Findings: No rash.   Neurological:      Mental Status: She is alert.      Gait: Gait normal.   Psychiatric:         Behavior: Behavior normal.         Thought Content: Thought content normal.         Assessment:       Problem List Items Addressed This Visit        Psychiatric    Anxiety and depression    Relevant Medications    sertraline (ZOLOFT) 50 MG tablet       Cardiac/Vascular    Primary hypertension       Endocrine    Type 2 diabetes mellitus without complication, without long-term current use of insulin - Primary    Relevant Medications    metFORMIN (GLUCOPHAGE) 500 MG tablet    Other Relevant Orders    Ambulatory referral/consult to Nutrition Services      Other Visit Diagnoses     Hypertension, unspecified type        Relevant Medications    lisinopriL-hydrochlorothiazide (PRINZIDE,ZESTORETIC) 20-25 mg Tab    metoprolol succinate (TOPROL-XL) 25 MG 24 hr tablet    Morbid obesity        Relevant Orders    Ambulatory referral/consult to Nutrition Services          Plan:       Problem List Items Addressed This Visit        Psychiatric    Anxiety and depression    Overview     Patient has not had formal evaluation but is amenable due to panic attack and  possible PTSD for referral to behavioral health  Patient states she has failed Seroquel 25, trazodone, Wellbutrin  Was given Klonopin but states she does not like to take this medicine as it makes her excessively   tired  Trying Zoloft 25 with titration to 50           Relevant Medications    sertraline (ZOLOFT) 50 MG tablet       Cardiac/Vascular    Primary hypertension    Overview     Continuing metoprolol 25, switching HCTZ to HCTZ lisinopril 20/25 and stopping amlodipine.  Will recheck kidney function and BMP in one month               Endocrine    Type 2 diabetes mellitus without complication, without long-term current use of insulin - Primary    Overview     A1c increasing.  Will increase metformin to 500 mg BID   Referring to diabetes education for nutrition and weight loss as well as patient with concern about inability to lose weight            Relevant Medications    metFORMIN (GLUCOPHAGE) 500 MG tablet    Other Relevant Orders    Ambulatory referral/consult to Nutrition Services      Other Visit Diagnoses     Hypertension, unspecified type        Relevant Medications    lisinopriL-hydrochlorothiazide (PRINZIDE,ZESTORETIC) 20-25 mg Tab    metoprolol succinate (TOPROL-XL) 25 MG 24 hr tablet    Morbid obesity        Relevant Orders    Ambulatory referral/consult to Nutrition Services       Follow up in about 1 month (around 9/9/2022) for Hypertension- with BMP . up

## 2022-09-13 ENCOUNTER — OFFICE VISIT (OUTPATIENT)
Dept: FAMILY MEDICINE | Facility: CLINIC | Age: 32
End: 2022-09-13
Payer: MEDICAID

## 2022-09-13 VITALS
BODY MASS INDEX: 43.13 KG/M2 | SYSTOLIC BLOOD PRESSURE: 121 MMHG | DIASTOLIC BLOOD PRESSURE: 78 MMHG | RESPIRATION RATE: 20 BRPM | OXYGEN SATURATION: 100 % | WEIGHT: 274.81 LBS | HEIGHT: 67 IN | TEMPERATURE: 98 F | HEART RATE: 88 BPM

## 2022-09-13 DIAGNOSIS — Z90.5 SOLITARY KIDNEY, ACQUIRED: ICD-10-CM

## 2022-09-13 DIAGNOSIS — G89.29 CHRONIC MIDLINE THORACIC BACK PAIN: ICD-10-CM

## 2022-09-13 DIAGNOSIS — I10 HYPERTENSION, UNSPECIFIED TYPE: Primary | ICD-10-CM

## 2022-09-13 DIAGNOSIS — F41.9 ANXIETY AND DEPRESSION: ICD-10-CM

## 2022-09-13 DIAGNOSIS — F41.9 ANXIETY: ICD-10-CM

## 2022-09-13 DIAGNOSIS — G47.00 INSOMNIA, UNSPECIFIED TYPE: ICD-10-CM

## 2022-09-13 DIAGNOSIS — F32.A ANXIETY AND DEPRESSION: ICD-10-CM

## 2022-09-13 DIAGNOSIS — M54.6 CHRONIC MIDLINE THORACIC BACK PAIN: ICD-10-CM

## 2022-09-13 DIAGNOSIS — R22.32 MASS OF AXILLA, LEFT: ICD-10-CM

## 2022-09-13 LAB
ANION GAP SERPL CALC-SCNC: 9 MEQ/L
BUN SERPL-MCNC: 9.8 MG/DL (ref 7–18.7)
CALCIUM SERPL-MCNC: 9.6 MG/DL (ref 8.4–10.2)
CHLORIDE SERPL-SCNC: 101 MMOL/L (ref 98–107)
CO2 SERPL-SCNC: 27 MMOL/L (ref 22–29)
CREAT SERPL-MCNC: 0.93 MG/DL (ref 0.55–1.02)
CREAT/UREA NIT SERPL: 11
GFR SERPLBLD CREATININE-BSD FMLA CKD-EPI: >60 MLS/MIN/1.73/M2
GLUCOSE SERPL-MCNC: 124 MG/DL (ref 74–100)
POTASSIUM SERPL-SCNC: 3.6 MMOL/L (ref 3.5–5.1)
SODIUM SERPL-SCNC: 137 MMOL/L (ref 136–145)

## 2022-09-13 PROCEDURE — 99214 OFFICE O/P EST MOD 30 MIN: CPT | Mod: S$PBB,,, | Performed by: STUDENT IN AN ORGANIZED HEALTH CARE EDUCATION/TRAINING PROGRAM

## 2022-09-13 PROCEDURE — 3074F SYST BP LT 130 MM HG: CPT | Mod: CPTII,,, | Performed by: STUDENT IN AN ORGANIZED HEALTH CARE EDUCATION/TRAINING PROGRAM

## 2022-09-13 PROCEDURE — 99214 OFFICE O/P EST MOD 30 MIN: CPT | Mod: PBBFAC,PN | Performed by: STUDENT IN AN ORGANIZED HEALTH CARE EDUCATION/TRAINING PROGRAM

## 2022-09-13 PROCEDURE — 1159F PR MEDICATION LIST DOCUMENTED IN MEDICAL RECORD: ICD-10-PCS | Mod: CPTII,,, | Performed by: STUDENT IN AN ORGANIZED HEALTH CARE EDUCATION/TRAINING PROGRAM

## 2022-09-13 PROCEDURE — 1159F MED LIST DOCD IN RCRD: CPT | Mod: CPTII,,, | Performed by: STUDENT IN AN ORGANIZED HEALTH CARE EDUCATION/TRAINING PROGRAM

## 2022-09-13 PROCEDURE — 3078F DIAST BP <80 MM HG: CPT | Mod: CPTII,,, | Performed by: STUDENT IN AN ORGANIZED HEALTH CARE EDUCATION/TRAINING PROGRAM

## 2022-09-13 PROCEDURE — 4010F PR ACE/ARB THEARPY RXD/TAKEN: ICD-10-PCS | Mod: CPTII,,, | Performed by: STUDENT IN AN ORGANIZED HEALTH CARE EDUCATION/TRAINING PROGRAM

## 2022-09-13 PROCEDURE — 3008F BODY MASS INDEX DOCD: CPT | Mod: CPTII,,, | Performed by: STUDENT IN AN ORGANIZED HEALTH CARE EDUCATION/TRAINING PROGRAM

## 2022-09-13 PROCEDURE — 3074F PR MOST RECENT SYSTOLIC BLOOD PRESSURE < 130 MM HG: ICD-10-PCS | Mod: CPTII,,, | Performed by: STUDENT IN AN ORGANIZED HEALTH CARE EDUCATION/TRAINING PROGRAM

## 2022-09-13 PROCEDURE — 3078F PR MOST RECENT DIASTOLIC BLOOD PRESSURE < 80 MM HG: ICD-10-PCS | Mod: CPTII,,, | Performed by: STUDENT IN AN ORGANIZED HEALTH CARE EDUCATION/TRAINING PROGRAM

## 2022-09-13 PROCEDURE — 4010F ACE/ARB THERAPY RXD/TAKEN: CPT | Mod: CPTII,,, | Performed by: STUDENT IN AN ORGANIZED HEALTH CARE EDUCATION/TRAINING PROGRAM

## 2022-09-13 PROCEDURE — 36415 COLL VENOUS BLD VENIPUNCTURE: CPT | Performed by: STUDENT IN AN ORGANIZED HEALTH CARE EDUCATION/TRAINING PROGRAM

## 2022-09-13 PROCEDURE — 3008F PR BODY MASS INDEX (BMI) DOCUMENTED: ICD-10-PCS | Mod: CPTII,,, | Performed by: STUDENT IN AN ORGANIZED HEALTH CARE EDUCATION/TRAINING PROGRAM

## 2022-09-13 PROCEDURE — 99214 PR OFFICE/OUTPT VISIT, EST, LEVL IV, 30-39 MIN: ICD-10-PCS | Mod: S$PBB,,, | Performed by: STUDENT IN AN ORGANIZED HEALTH CARE EDUCATION/TRAINING PROGRAM

## 2022-09-13 PROCEDURE — 80048 BASIC METABOLIC PNL TOTAL CA: CPT | Performed by: STUDENT IN AN ORGANIZED HEALTH CARE EDUCATION/TRAINING PROGRAM

## 2022-09-13 RX ORDER — ESCITALOPRAM OXALATE 5 MG/1
5 TABLET ORAL DAILY
Qty: 30 TABLET | Refills: 11 | Status: SHIPPED | OUTPATIENT
Start: 2022-09-13 | End: 2023-06-29

## 2022-09-13 RX ORDER — TRAZODONE HYDROCHLORIDE 50 MG/1
50 TABLET ORAL NIGHTLY
Qty: 30 TABLET | Refills: 11 | Status: SHIPPED | OUTPATIENT
Start: 2022-09-13 | End: 2023-02-22

## 2022-09-13 RX ORDER — METHOCARBAMOL 750 MG/1
750 TABLET, FILM COATED ORAL 3 TIMES DAILY
Qty: 30 TABLET | Refills: 0 | Status: SHIPPED | OUTPATIENT
Start: 2022-09-13 | End: 2022-09-23

## 2022-09-13 NOTE — PROGRESS NOTES
Subjective:       Patient ID: Luh Chowdary is a 32 y.o. female.    Chief Complaint: Follow-up    HPI       32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 to clinic for follow up     Anxiety depression possible PTSD/insomnia  Reports history of anxiety and depression acutely worsening in July of 2021 when her best friend was found dead unexpectedly  Also reports having 4 miscarriages which were very traumatizing for her  Her doctor had initially prescribed trazodone to help her sleep, Seroquel 25 mg to help her sleep but both made her too tired  To function the next morning also prescribed Wellbutrin  For anxiety and depression also prescribed Klonopin to take  When patient is having panic attacks  Most recent panic attack occurred July 10th in which patient had to go to the hospital for chest pain had negative cardiac workup  No SI or HI   Zoloft titrated to 50 mg and sent referral to Dr. Gallegos  Patient reports that she does not like the way that she feels after she takes the zoloft and feels that her anxiety is increasing. Would like to discuss different medication    Hypertension  Takes metoprolol ER 25 mg, HCTZ 25 mg, amlodipine 5 mg daily  Reports that she is having leg  Swelling which she attributed to the amlodipine of note patient has a solitary kidney having had 1 surgically removed secondary to infection  Last visit started on Lisinopril hctz 20/25 with repeat /78  Reports no issues with medication    Obesity  Has tried phentermine to lose weight in the past    Diabetes  Takes metformin 500 mg daily BID      Left axillary mass   Also notes that she has left axillary mass.  Feels that it has been there for years but reports that it may be enlarging.  Does report that it is more tender around her menstrual cycle. Also reports same issue in thoracic back.  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   HENT:  Negative for nasal congestion.    Respiratory:  Negative for cough, shortness of breath and wheezing.    Cardiovascular:  Negative for chest pain.   Genitourinary:  Negative for dysuria.   Integumentary:  Negative for rash and wound.   Neurological:  Negative for dizziness and syncope.   Psychiatric/Behavioral:  Positive for sleep disturbance. Negative for dysphoric mood. The patient is nervous/anxious.        Objective:      Physical Exam  Constitutional:       General: She is not in acute distress.  Eyes:      General: No scleral icterus.     Extraocular Movements: Extraocular movements intact.      Conjunctiva/sclera: Conjunctivae normal.      Pupils: Pupils are equal, round, and reactive to light.   Cardiovascular:      Rate and Rhythm: Normal rate and regular rhythm.      Heart sounds: No murmur heard.  Pulmonary:      Effort: Pulmonary effort is normal. No respiratory distress.      Breath sounds: No stridor. No wheezing or rhonchi.   Musculoskeletal:         General: No swelling. Normal range of motion.   Skin:     General: Skin is warm and dry.      Coloration: Skin is not jaundiced.      Findings: No rash.   Neurological:      Mental Status: She is alert.      Gait: Gait normal.   Psychiatric:         Behavior: Behavior normal.         Thought Content: Thought content normal.       Assessment:       Problem List Items Addressed This Visit    None  Visit Diagnoses       Hypertension, unspecified type    -  Primary    Relevant Orders    Basic Metabolic Panel    Chronic midline thoracic back pain        Relevant Medications    methocarbamoL (ROBAXIN) 750 MG Tab    Mass of axilla, left        Relevant Orders    US Breast Left Limited            Plan:       Problem List Items Addressed This Visit          Psychiatric    Anxiety and depression     Overview     Patient has not had formal evaluation but is amenable due to panic attack and possible PTSD for referral to behavioral health  Patient states she has failed Seroquel 25, trazodone, Wellbutrin  Was given Klonopin but states she does not like to take this medicine as it makes her excessively   tired  Zoloft made patient tired.  Trazodone being added for adjunct for sleep but will start Lexapro 5 mg (half dose first week) for anxiety             Renal/    Solitary kidney, acquired    Overview     Monitoring BMP   Continue Metoprolol 25 mg daily and continue Lisinopril- HCTZ 20/25          Other Visit Diagnoses       Hypertension, unspecified type    -  Primary    Relevant Orders    Basic Metabolic Panel    Chronic midline thoracic back pain        Relevant Medications    methocarbamoL (ROBAXIN) 750 MG Tab    Mass of axilla, left        Relevant Orders    US Breast Left Limited    Anxiety        Relevant Medications    traZODone (DESYREL) 50 MG tablet    Insomnia, unspecified type        Relevant Medications    traZODone (DESYREL) 50 MG tablet           Problem List Items Addressed This Visit    None  Visit Diagnoses       Hypertension, unspecified type    -  Primary    Relevant Orders    Basic Metabolic Panel    Chronic midline thoracic back pain        Relevant Medications    methocarbamoL (ROBAXIN) 750 MG Tab    Mass of axilla, left        Relevant Orders    US Breast Left Limited         Follow up in about 2 weeks (around 9/27/2022) for Virtual Visit, anxiety. up

## 2022-09-21 ENCOUNTER — HISTORICAL (OUTPATIENT)
Dept: ADMINISTRATIVE | Facility: HOSPITAL | Age: 32
End: 2022-09-21
Payer: MEDICAID

## 2022-10-07 ENCOUNTER — TELEPHONE (OUTPATIENT)
Dept: FAMILY MEDICINE | Facility: CLINIC | Age: 32
End: 2022-10-07
Payer: MEDICAID

## 2022-11-22 ENCOUNTER — HOSPITAL ENCOUNTER (OUTPATIENT)
Dept: RADIOLOGY | Facility: HOSPITAL | Age: 32
Discharge: HOME | End: 2022-11-22
Attending: STUDENT IN AN ORGANIZED HEALTH CARE EDUCATION/TRAINING PROGRAM
Payer: MEDICAID

## 2022-11-22 DIAGNOSIS — R22.32 MASS OF AXILLA, LEFT: ICD-10-CM

## 2022-11-22 PROCEDURE — 77066 DX MAMMO INCL CAD BI: CPT | Mod: 26,,, | Performed by: RADIOLOGY

## 2022-11-22 PROCEDURE — 77066 MAMMO DIGITAL DIAGNOSTIC BILAT WITH TOMO: ICD-10-PCS | Mod: 26,,, | Performed by: RADIOLOGY

## 2022-11-22 PROCEDURE — 77062 BREAST TOMOSYNTHESIS BI: CPT | Mod: TC

## 2022-11-22 PROCEDURE — 76882 US LMTD JT/FCL EVL NVASC XTR: CPT | Mod: TC,LT

## 2022-11-22 PROCEDURE — 76882 US LMTD JT/FCL EVL NVASC XTR: CPT | Mod: 26,LT,, | Performed by: RADIOLOGY

## 2022-11-22 PROCEDURE — 77062 MAMMO DIGITAL DIAGNOSTIC BILAT WITH TOMO: ICD-10-PCS | Mod: 26,,, | Performed by: RADIOLOGY

## 2022-11-22 PROCEDURE — 77062 BREAST TOMOSYNTHESIS BI: CPT | Mod: 26,,, | Performed by: RADIOLOGY

## 2022-11-22 PROCEDURE — 76882 US AXILLA ONLY (BREAST IMAGING) LEFT: ICD-10-PCS | Mod: 26,LT,, | Performed by: RADIOLOGY

## 2023-01-10 DIAGNOSIS — E11.9 TYPE 2 DIABETES MELLITUS WITHOUT COMPLICATION, WITHOUT LONG-TERM CURRENT USE OF INSULIN: ICD-10-CM

## 2023-01-10 RX ORDER — METFORMIN HYDROCHLORIDE 500 MG/1
500 TABLET ORAL 2 TIMES DAILY WITH MEALS
Qty: 180 TABLET | Refills: 0 | Status: SHIPPED | OUTPATIENT
Start: 2023-01-10 | End: 2023-03-15

## 2023-01-10 NOTE — TELEPHONE ENCOUNTER
Care Due:                  Date            Visit Type   Department     Provider  --------------------------------------------------------------------------------                                EP -                              PRIMARY      [facility] FAMILY  Last Visit: 09-      CARE ([facility])   MEDICINE       Zara Barrett  Next Visit: None Scheduled  None         None Found                                                            Last  Test          Frequency    Reason                     Performed    Due Date  --------------------------------------------------------------------------------    HBA1C.......  6 months...  metFORMIN................  07-   01-    [facility] Embedded Care Gaps. Reference number: [id number]. 1/10/2023   10:19:12 AM CST

## 2023-01-10 NOTE — TELEPHONE ENCOUNTER
Refill Decision Note   Luh Chowdary  is requesting a refill authorization.  Brief Assessment and Rationale for Refill:  Approve    -Medication-Related Problems Identified: Requires labs  Medication Therapy Plan:       Medication Reconciliation Completed: No   Comments:     Provider Staff:     Action is required for this patient.   Please see care gap opportunities below in Care Due Message.     Thanks!  [facility]     Appointments      Date Provider   Last Visit   9/13/2022 Zara Barrett MD   Next Visit   Visit date not found Zara Barrett MD     Note composed:10:20 AM 01/10/2023           Note composed:10:20 AM 01/10/2023

## 2023-01-31 ENCOUNTER — DOCUMENTATION ONLY (OUTPATIENT)
Dept: ADMINISTRATIVE | Facility: HOSPITAL | Age: 33
End: 2023-01-31

## 2023-02-02 ENCOUNTER — TELEPHONE (OUTPATIENT)
Dept: FAMILY MEDICINE | Facility: CLINIC | Age: 33
End: 2023-02-02

## 2023-02-03 NOTE — TELEPHONE ENCOUNTER
Patient has been on the same dose of metformin for months now. It was one in the AM and one in the PM.  I DID prescribe methocarbamol (Robaxin) for her which may be causing the nausea. She can stop this if needed and use a heating pad and take tylenol.  If she is still having issues after you talk to her, please tell her to stop the medication that is making her sick and please send her to the  for an appointment. Also ED precautions if she cannot hold down PO intake.

## 2023-02-22 ENCOUNTER — TELEPHONE (OUTPATIENT)
Dept: FAMILY MEDICINE | Facility: CLINIC | Age: 33
End: 2023-02-22

## 2023-02-22 ENCOUNTER — OFFICE VISIT (OUTPATIENT)
Dept: FAMILY MEDICINE | Facility: CLINIC | Age: 33
End: 2023-02-22
Payer: MEDICAID

## 2023-02-22 DIAGNOSIS — E11.9 TYPE 2 DIABETES MELLITUS WITHOUT COMPLICATION, WITHOUT LONG-TERM CURRENT USE OF INSULIN: ICD-10-CM

## 2023-02-22 DIAGNOSIS — F32.A ANXIETY AND DEPRESSION: Primary | ICD-10-CM

## 2023-02-22 DIAGNOSIS — Z00.00 WELLNESS EXAMINATION: ICD-10-CM

## 2023-02-22 DIAGNOSIS — I10 PRIMARY HYPERTENSION: ICD-10-CM

## 2023-02-22 DIAGNOSIS — F41.9 ANXIETY AND DEPRESSION: Primary | ICD-10-CM

## 2023-02-22 DIAGNOSIS — L65.9 HAIR LOSS: ICD-10-CM

## 2023-02-22 PROCEDURE — 80053 COMPREHEN METABOLIC PANEL: CPT | Performed by: STUDENT IN AN ORGANIZED HEALTH CARE EDUCATION/TRAINING PROGRAM

## 2023-02-22 PROCEDURE — 85025 COMPLETE CBC W/AUTO DIFF WBC: CPT | Performed by: STUDENT IN AN ORGANIZED HEALTH CARE EDUCATION/TRAINING PROGRAM

## 2023-02-22 PROCEDURE — 80061 LIPID PANEL: CPT | Performed by: STUDENT IN AN ORGANIZED HEALTH CARE EDUCATION/TRAINING PROGRAM

## 2023-02-22 PROCEDURE — 36415 COLL VENOUS BLD VENIPUNCTURE: CPT | Performed by: STUDENT IN AN ORGANIZED HEALTH CARE EDUCATION/TRAINING PROGRAM

## 2023-02-22 PROCEDURE — 99214 OFFICE O/P EST MOD 30 MIN: CPT | Mod: 95,,, | Performed by: STUDENT IN AN ORGANIZED HEALTH CARE EDUCATION/TRAINING PROGRAM

## 2023-02-22 PROCEDURE — 83036 HEMOGLOBIN GLYCOSYLATED A1C: CPT | Performed by: STUDENT IN AN ORGANIZED HEALTH CARE EDUCATION/TRAINING PROGRAM

## 2023-02-22 PROCEDURE — 99214 PR OFFICE/OUTPT VISIT, EST, LEVL IV, 30-39 MIN: ICD-10-PCS | Mod: 95,,, | Performed by: STUDENT IN AN ORGANIZED HEALTH CARE EDUCATION/TRAINING PROGRAM

## 2023-02-22 PROCEDURE — 4010F PR ACE/ARB THEARPY RXD/TAKEN: ICD-10-PCS | Mod: CPTII,95,, | Performed by: STUDENT IN AN ORGANIZED HEALTH CARE EDUCATION/TRAINING PROGRAM

## 2023-02-22 PROCEDURE — 81001 URINALYSIS AUTO W/SCOPE: CPT | Performed by: STUDENT IN AN ORGANIZED HEALTH CARE EDUCATION/TRAINING PROGRAM

## 2023-02-22 PROCEDURE — 4010F ACE/ARB THERAPY RXD/TAKEN: CPT | Mod: CPTII,95,, | Performed by: STUDENT IN AN ORGANIZED HEALTH CARE EDUCATION/TRAINING PROGRAM

## 2023-02-22 PROCEDURE — 84439 ASSAY OF FREE THYROXINE: CPT | Performed by: STUDENT IN AN ORGANIZED HEALTH CARE EDUCATION/TRAINING PROGRAM

## 2023-02-22 PROCEDURE — 84443 ASSAY THYROID STIM HORMONE: CPT | Performed by: STUDENT IN AN ORGANIZED HEALTH CARE EDUCATION/TRAINING PROGRAM

## 2023-02-22 PROCEDURE — 82043 UR ALBUMIN QUANTITATIVE: CPT | Performed by: STUDENT IN AN ORGANIZED HEALTH CARE EDUCATION/TRAINING PROGRAM

## 2023-02-22 NOTE — PROGRESS NOTES
Audio Only Telehealth Visit     The patient location is: home  The chief complaint leading to consultation is: hair loss, sugar cravings  Visit type: Virtual visit with audio only (telephone)  Total time spent with patient: 15 minutes   The reason for the audio only service rather than synchronous audio and video virtual visit was related to technical difficulties or patient preference/necessity.     Each patient to whom I provide medical services by telemedicine is:  (1) informed of the relationship between the physician and patient and the respective role of any other health care provider with respect to management of the patient; and (2) notified that they may decline to receive medical services by telemedicine and may withdraw from such care at any time. Patient verbally consented to receive this service via voice-only telephone call.       HPI:     32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 to clinic for follow up       Hair loss  Reports that she is having hair loss at the front of her scalp. Her hairdresser noticed it as well. Reports no other symptoms except for sugar cravings.  Reports that she feels bad in the AM unless she has something with sugar in it.      Anxiety depression possible PTSD/insomnia  Reports history of anxiety and depression acutely worsening in July of 2021 when her best friend was found dead unexpectedly  Also reports having 4 miscarriages which were very traumatizing for her  Her doctor had initially prescribed trazodone to help her sleep, Seroquel 25 mg to help her sleep but both made her too tired  To function the next morning also prescribed Wellbutrin  For anxiety and depression also prescribed Klonopin to take  When patient is having panic attacks  Most recent panic attack occurred July 10th in which patient had to go to the hospital for chest pain had negative cardiac workup  No SI or HI   Zoloft titrated to 50 mg and sent referral to Dr. Gallegos     Hypertension  Takes metoprolol ER 25 mg,  HCTZ 25 mg, amlodipine 5 mg daily  Reports that she is having leg  Swelling which she attributed to the amlodipine of note patient has a solitary kidney having had 1 surgically removed secondary to infection  Last visit started on Lisinopril hctz 20/25 with repeat /78  Reports no issues with medication     Obesity  Has tried phentermine to lose weight in the past     Diabetes  Takes metformin 500 mg daily BID        Left axillary mass   Also notes that she has left axillary mass.  Feels that it has been there for years but reports that it may be enlarging.  Does report that it is more tender around her menstrual cycle. Also reports same issue in thoracic back.  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  Trazodone being added for adjunct for sleep but will start Lexapro 5 mg (half dose first week) for anxiety          Relevant Orders    Vitamin B12    Microalbumin/Creatinine Ratio, Urine    CBC Auto Differential    Comprehensive Metabolic Panel    Lipid Panel    T4, Free    TSH    Urinalysis    Hemoglobin A1C       Derm    Hair loss    Overview     TSH, T4 B12            Cardiac/Vascular    Primary hypertension    Overview     Continuing metoprolol 25 HCTZ lisinopril 20/25 started last visit but patient did not present for follow up at the end of September 2022.   Stopped amlodipine.  CMP ordered             Endocrine    Type 2 diabetes mellitus without complication, without long-term current use of insulin    Overview     A1c increasing.  Will increase metformin to 500 mg BID   Referring to diabetes education for nutrition and weight loss as well as patient with concern about inability to lose weight          Relevant Orders    Microalbumin/Creatinine Ratio, Urine    Urinalysis    Hemoglobin A1C    Hepatitis Panel, Acute     Other Visit Diagnoses       Wellness examination        Relevant Orders    Hepatitis Panel, Acute                      .Follow up in about 1 month (around 3/22/2023) for Diabetes, lab results, foot and eye exam.            This service was not originating from a related E/M service provided within the previous 7 days nor will  to an E/M service or procedure within the next 24 hours or my soonest available appointment.  Prevailing standard of care was able to be met in this audio-only visit.

## 2023-02-23 ENCOUNTER — APPOINTMENT (OUTPATIENT)
Dept: LAB | Facility: HOSPITAL | Age: 33
End: 2023-02-23
Attending: STUDENT IN AN ORGANIZED HEALTH CARE EDUCATION/TRAINING PROGRAM
Payer: MEDICAID

## 2023-02-23 DIAGNOSIS — N83.209 CYST OF OVARY, UNSPECIFIED LATERALITY: Primary | ICD-10-CM

## 2023-02-23 LAB
ALBUMIN SERPL-MCNC: 3.7 G/DL (ref 3.5–5)
ALBUMIN/GLOB SERPL: 0.9 RATIO (ref 1.1–2)
ALP SERPL-CCNC: 70 UNIT/L (ref 40–150)
ALT SERPL-CCNC: 15 UNIT/L (ref 0–55)
APPEARANCE UR: CLEAR
AST SERPL-CCNC: 14 UNIT/L (ref 5–34)
BACTERIA #/AREA URNS AUTO: ABNORMAL /HPF
BASOPHILS # BLD AUTO: 0.02 X10(3)/MCL (ref 0–0.2)
BASOPHILS NFR BLD AUTO: 0.4 %
BILIRUB UR QL STRIP.AUTO: NEGATIVE MG/DL
BILIRUBIN DIRECT+TOT PNL SERPL-MCNC: 0.5 MG/DL
BUN SERPL-MCNC: 12.1 MG/DL (ref 7–18.7)
CALCIUM SERPL-MCNC: 9.6 MG/DL (ref 8.4–10.2)
CHLORIDE SERPL-SCNC: 102 MMOL/L (ref 98–107)
CHOLEST SERPL-MCNC: 215 MG/DL
CHOLEST/HDLC SERPL: 7 {RATIO} (ref 0–5)
CO2 SERPL-SCNC: 26 MMOL/L (ref 22–29)
COLOR UR AUTO: ABNORMAL
CREAT SERPL-MCNC: 1.08 MG/DL (ref 0.55–1.02)
CREAT UR-MCNC: 172.1 MG/DL (ref 47–110)
EOSINOPHIL # BLD AUTO: 0.16 X10(3)/MCL (ref 0–0.9)
EOSINOPHIL NFR BLD AUTO: 3.5 %
ERYTHROCYTE [DISTWIDTH] IN BLOOD BY AUTOMATED COUNT: 14.1 % (ref 11.5–17)
EST. AVERAGE GLUCOSE BLD GHB EST-MCNC: 116.9 MG/DL
GFR SERPLBLD CREATININE-BSD FMLA CKD-EPI: >60 MLS/MIN/1.73/M2
GLOBULIN SER-MCNC: 3.9 GM/DL (ref 2.4–3.5)
GLUCOSE SERPL-MCNC: 91 MG/DL (ref 74–100)
GLUCOSE UR QL STRIP.AUTO: NORMAL MG/DL
HBA1C MFR BLD: 5.7 %
HCT VFR BLD AUTO: 36.2 % (ref 37–47)
HDLC SERPL-MCNC: 33 MG/DL (ref 35–60)
HGB BLD-MCNC: 11.2 G/DL (ref 12–16)
HYALINE CASTS #/AREA URNS LPF: ABNORMAL /LPF
IMM GRANULOCYTES # BLD AUTO: 0.01 X10(3)/MCL (ref 0–0.04)
IMM GRANULOCYTES NFR BLD AUTO: 0.2 %
KETONES UR QL STRIP.AUTO: NEGATIVE MG/DL
LDLC SERPL CALC-MCNC: 150 MG/DL (ref 50–140)
LEUKOCYTE ESTERASE UR QL STRIP.AUTO: 250 UNIT/L
LYMPHOCYTES # BLD AUTO: 1.9 X10(3)/MCL (ref 0.6–4.6)
LYMPHOCYTES NFR BLD AUTO: 41.9 %
MCH RBC QN AUTO: 25.6 PG
MCHC RBC AUTO-ENTMCNC: 30.9 G/DL (ref 33–36)
MCV RBC AUTO: 82.8 FL (ref 80–94)
MICROALBUMIN UR-MCNC: <5 UG/ML
MICROALBUMIN/CREAT RATIO PNL UR: <2.9 MG/GM CR (ref 0–30)
MONOCYTES # BLD AUTO: 0.47 X10(3)/MCL (ref 0.1–1.3)
MONOCYTES NFR BLD AUTO: 10.4 %
MUCOUS THREADS URNS QL MICRO: ABNORMAL /LPF
NEUTROPHILS # BLD AUTO: 1.98 X10(3)/MCL (ref 2.1–9.2)
NEUTROPHILS NFR BLD AUTO: 43.6 %
NITRITE UR QL STRIP.AUTO: NEGATIVE
NRBC BLD AUTO-RTO: 0 %
PH UR STRIP.AUTO: 6.5 [PH]
PLATELET # BLD AUTO: 358 X10(3)/MCL (ref 130–400)
PMV BLD AUTO: 9.8 FL (ref 7.4–10.4)
POTASSIUM SERPL-SCNC: 4.1 MMOL/L (ref 3.5–5.1)
PROT SERPL-MCNC: 7.6 GM/DL (ref 6.4–8.3)
PROT UR QL STRIP.AUTO: NEGATIVE MG/DL
RBC # BLD AUTO: 4.37 X10(6)/MCL (ref 4.2–5.4)
RBC #/AREA URNS AUTO: ABNORMAL /HPF
RBC UR QL AUTO: NEGATIVE UNIT/L
SODIUM SERPL-SCNC: 135 MMOL/L (ref 136–145)
SP GR UR STRIP.AUTO: 1.02
SQUAMOUS #/AREA URNS LPF: ABNORMAL /HPF
T4 FREE SERPL-MCNC: 0.9 NG/DL (ref 0.7–1.48)
TRIGL SERPL-MCNC: 161 MG/DL (ref 37–140)
TSH SERPL-ACNC: 1.93 UIU/ML (ref 0.35–4.94)
UROBILINOGEN UR STRIP-ACNC: NORMAL MG/DL
VLDLC SERPL CALC-MCNC: 32 MG/DL
WBC # SPEC AUTO: 4.5 X10(3)/MCL (ref 4.5–11.5)
WBC #/AREA URNS AUTO: ABNORMAL /HPF

## 2023-02-23 NOTE — TELEPHONE ENCOUNTER
I have placed a referral to gyn for the patient BUT cysts don't usually cause difficulty walking so this could be something else. I do have a follow up visit for her scheduled so we can discuss this then. I also have placed a referral to gyn but new patient appointments are now booked a year out so she can call the number and check on her appointment.  She can also call the back of her insurance card for gynecologist who take her insurance.

## 2023-02-23 NOTE — TELEPHONE ENCOUNTER
I just sent the referral but if patient is established with her gynecologist all she needs to do it call and make an appointment.

## 2023-03-03 ENCOUNTER — TELEPHONE (OUTPATIENT)
Dept: FAMILY MEDICINE | Facility: CLINIC | Age: 33
End: 2023-03-03

## 2023-03-10 ENCOUNTER — TELEPHONE (OUTPATIENT)
Dept: OBSTETRICS AND GYNECOLOGY | Facility: CLINIC | Age: 33
End: 2023-03-10

## 2023-03-15 ENCOUNTER — OFFICE VISIT (OUTPATIENT)
Dept: FAMILY MEDICINE | Facility: CLINIC | Age: 33
End: 2023-03-15
Payer: MEDICAID

## 2023-03-15 DIAGNOSIS — F41.9 ANXIETY AND DEPRESSION: ICD-10-CM

## 2023-03-15 DIAGNOSIS — Z90.5 SOLITARY KIDNEY, ACQUIRED: ICD-10-CM

## 2023-03-15 DIAGNOSIS — I10 PRIMARY HYPERTENSION: ICD-10-CM

## 2023-03-15 DIAGNOSIS — F51.01 PRIMARY INSOMNIA: ICD-10-CM

## 2023-03-15 DIAGNOSIS — F32.A ANXIETY AND DEPRESSION: ICD-10-CM

## 2023-03-15 DIAGNOSIS — E11.9 TYPE 2 DIABETES MELLITUS WITHOUT COMPLICATION, WITHOUT LONG-TERM CURRENT USE OF INSULIN: Primary | ICD-10-CM

## 2023-03-15 PROCEDURE — 3066F NEPHROPATHY DOC TX: CPT | Mod: CPTII,95,, | Performed by: STUDENT IN AN ORGANIZED HEALTH CARE EDUCATION/TRAINING PROGRAM

## 2023-03-15 PROCEDURE — 3061F PR NEG MICROALBUMINURIA RESULT DOCUMENTED/REVIEW: ICD-10-PCS | Mod: CPTII,95,, | Performed by: STUDENT IN AN ORGANIZED HEALTH CARE EDUCATION/TRAINING PROGRAM

## 2023-03-15 PROCEDURE — 99214 PR OFFICE/OUTPT VISIT, EST, LEVL IV, 30-39 MIN: ICD-10-PCS | Mod: 95,,, | Performed by: STUDENT IN AN ORGANIZED HEALTH CARE EDUCATION/TRAINING PROGRAM

## 2023-03-15 PROCEDURE — 99214 OFFICE O/P EST MOD 30 MIN: CPT | Mod: 95,,, | Performed by: STUDENT IN AN ORGANIZED HEALTH CARE EDUCATION/TRAINING PROGRAM

## 2023-03-15 PROCEDURE — 3061F NEG MICROALBUMINURIA REV: CPT | Mod: CPTII,95,, | Performed by: STUDENT IN AN ORGANIZED HEALTH CARE EDUCATION/TRAINING PROGRAM

## 2023-03-15 PROCEDURE — 4010F PR ACE/ARB THEARPY RXD/TAKEN: ICD-10-PCS | Mod: CPTII,95,, | Performed by: STUDENT IN AN ORGANIZED HEALTH CARE EDUCATION/TRAINING PROGRAM

## 2023-03-15 PROCEDURE — 4010F ACE/ARB THERAPY RXD/TAKEN: CPT | Mod: CPTII,95,, | Performed by: STUDENT IN AN ORGANIZED HEALTH CARE EDUCATION/TRAINING PROGRAM

## 2023-03-15 PROCEDURE — 1159F PR MEDICATION LIST DOCUMENTED IN MEDICAL RECORD: ICD-10-PCS | Mod: CPTII,95,, | Performed by: STUDENT IN AN ORGANIZED HEALTH CARE EDUCATION/TRAINING PROGRAM

## 2023-03-15 PROCEDURE — 1159F MED LIST DOCD IN RCRD: CPT | Mod: CPTII,95,, | Performed by: STUDENT IN AN ORGANIZED HEALTH CARE EDUCATION/TRAINING PROGRAM

## 2023-03-15 PROCEDURE — 3066F PR DOCUMENTATION OF TREATMENT FOR NEPHROPATHY: ICD-10-PCS | Mod: CPTII,95,, | Performed by: STUDENT IN AN ORGANIZED HEALTH CARE EDUCATION/TRAINING PROGRAM

## 2023-03-15 RX ORDER — SEMAGLUTIDE 1.34 MG/ML
0.25 INJECTION, SOLUTION SUBCUTANEOUS
Qty: 4 EACH | Refills: 6 | Status: SHIPPED | OUTPATIENT
Start: 2023-03-15 | End: 2023-04-14 | Stop reason: SDUPTHER

## 2023-03-15 RX ORDER — TRAZODONE HYDROCHLORIDE 50 MG/1
50 TABLET ORAL NIGHTLY
Qty: 30 TABLET | Refills: 11 | Status: SHIPPED | OUTPATIENT
Start: 2023-03-15 | End: 2023-08-11 | Stop reason: SDUPTHER

## 2023-03-16 PROBLEM — F51.01 PRIMARY INSOMNIA: Status: ACTIVE | Noted: 2023-03-16

## 2023-03-16 NOTE — PROGRESS NOTES
Audio Only Telehealth Visit     The patient location is: work  The chief complaint leading to consultation is: insomnia   Visit type: Virtual visit with audio only (telephone)  Total time spent with patient: 15 minutes      The reason for the audio only service rather than synchronous audio and video virtual visit was related to technical difficulties or patient preference/necessity.     Each patient to whom I provide medical services by telemedicine is:  (1) informed of the relationship between the physician and patient and the respective role of any other health care provider with respect to management of the patient; and (2) notified that they may decline to receive medical services by telemedicine and may withdraw from such care at any time. Patient verbally consented to receive this service via voice-only telephone call.       HPI:      33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 to clinic for follow up         Hair loss  Reports that she is having hair loss at the front of her scalp. Her hairdresser noticed it as well. Reports no other symptoms except for sugar cravings.  Reports that she feels bad in the AM unless she has something with sugar in it.      Anxiety depression possible PTSD/insomnia  Reports history of anxiety and depression acutely worsening in July of 2021 when her best friend was found dead unexpectedly  Also reports having 4 miscarriages which were very traumatizing for her  Her doctor had initially prescribed trazodone to help her sleep, Seroquel 25 mg to help her sleep but both made her too tired  To function the next morning also prescribed Wellbutrin  For anxiety and depression also prescribed Klonopin to take  When patient is having panic attacks  Most recent panic attack occurred July 10th in which patient had to go to the hospital for chest pain had negative cardiac workup  No SI or HI   Zoloft titrated to 50 mg and sent referral to Dr. Gallegos  Switched to Lexapro 5 mg. Doing well. Doesn't wish to  increase at this time.      Hypertension  Takes metoprolol ER 25 mg, HCTZ 25 mg, amlodipine 5 mg daily  Reports that she is having leg  Swelling which she attributed to the amlodipine of note patient has a solitary kidney having had 1 surgically removed secondary to infection  on Lisinopril hctz 20/25 with repeat /78  Reports no issues with medication     Obesity  Has tried phentermine to lose weight in the past     Diabetes  Takes metformin 500 mg daily BID  Would like to switch to injectable if possible to aid in weight loss               Med hx as above  Surgical hx- kidney removal secondary to infection  Family hx- Mother with pancreatic cancer  Social hx- non smoker, very occasional drinker, 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, teacher    32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 to clinic for follow up         Med hx as above  Surgical hx- kidney removal secondary to infection  Family hx- Mother with pancreatic cancer  Social hx- non smoker, very occasional drinker, 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 Continue Lexapro 5 mg                 Cardiac/Vascular    Primary hypertension    Overview     Continuing metoprolol 25 HCTZ lisinopril 20/25 started last visit   CMP with slightly elevated creatinine. Patient reports that she did not drink a lot of fluids that day and has been reminding herself to drink  Could be also slight increase with ACei  Will recheck in one month            Renal/    Solitary kidney, acquired    Overview     Monitoring BMP   Continue Metoprolol 25 mg daily and  continue Lisinopril- HCTZ 20/25         Relevant Orders    Basic Metabolic Panel       Endocrine    Type 2 diabetes mellitus without complication, without long-term current use of insulin - Primary    Overview     A1c increasing.  Patient concerned about sugar cravings.     Adding Ozempic 0.25 mg weekly for patient.    Discussed AE         Relevant Medications    semaglutide (OZEMPIC) 0.25 mg or 0.5 mg(2 mg/1.5 mL) pen injector       Other    Primary insomnia    Overview     Trazodone 50 mg added. May need to titrate                 Follow up in about 1 month (around 4/15/2023) for Diabetes with foot and eye exam .                  This service was not originating from a related E/M service provided within the previous 7 days nor will  to an E/M service or procedure within the next 24 hours or my soonest available appointment.  Prevailing standard of care was able to be met in this audio-only visit.

## 2023-04-03 ENCOUNTER — TELEPHONE (OUTPATIENT)
Dept: FAMILY MEDICINE | Facility: CLINIC | Age: 33
End: 2023-04-03

## 2023-04-05 ENCOUNTER — TELEPHONE (OUTPATIENT)
Dept: FAMILY MEDICINE | Facility: CLINIC | Age: 33
End: 2023-04-05

## 2023-04-05 DIAGNOSIS — R30.0 DYSURIA: Primary | ICD-10-CM

## 2023-04-05 NOTE — TELEPHONE ENCOUNTER
Her insurance won't pay for another round of ozempic yet.  She can refill when she can. Let's also schedule another nurse visit for her so that we can redo injection training because the medicine shouldn't be leaking out.  She needs to leave the needle in and count to 10.

## 2023-04-14 ENCOUNTER — OFFICE VISIT (OUTPATIENT)
Dept: FAMILY MEDICINE | Facility: CLINIC | Age: 33
End: 2023-04-14
Payer: MEDICAID

## 2023-04-14 ENCOUNTER — CLINICAL SUPPORT (OUTPATIENT)
Dept: FAMILY MEDICINE | Facility: CLINIC | Age: 33
End: 2023-04-14
Payer: MEDICAID

## 2023-04-14 VITALS
SYSTOLIC BLOOD PRESSURE: 119 MMHG | HEIGHT: 67 IN | RESPIRATION RATE: 20 BRPM | TEMPERATURE: 98 F | BODY MASS INDEX: 40.34 KG/M2 | DIASTOLIC BLOOD PRESSURE: 77 MMHG | HEART RATE: 97 BPM | WEIGHT: 257 LBS

## 2023-04-14 DIAGNOSIS — E11.9 TYPE 2 DIABETES MELLITUS WITHOUT COMPLICATION, WITHOUT LONG-TERM CURRENT USE OF INSULIN: Primary | ICD-10-CM

## 2023-04-14 DIAGNOSIS — F51.01 PRIMARY INSOMNIA: ICD-10-CM

## 2023-04-14 DIAGNOSIS — K59.09 OTHER CONSTIPATION: ICD-10-CM

## 2023-04-14 PROCEDURE — 3008F BODY MASS INDEX DOCD: CPT | Mod: CPTII,,, | Performed by: STUDENT IN AN ORGANIZED HEALTH CARE EDUCATION/TRAINING PROGRAM

## 2023-04-14 PROCEDURE — 3008F PR BODY MASS INDEX (BMI) DOCUMENTED: ICD-10-PCS | Mod: CPTII,,, | Performed by: STUDENT IN AN ORGANIZED HEALTH CARE EDUCATION/TRAINING PROGRAM

## 2023-04-14 PROCEDURE — 99214 OFFICE O/P EST MOD 30 MIN: CPT | Mod: S$PBB,,, | Performed by: STUDENT IN AN ORGANIZED HEALTH CARE EDUCATION/TRAINING PROGRAM

## 2023-04-14 PROCEDURE — 2025F PR 7 STANDARD FLD STEREO RETINAL PHOTOS W/O EVID OF RETINOPATHY W/INTERPT BY OPHTH/OPT: ICD-10-PCS | Mod: CPTII,,, | Performed by: STUDENT IN AN ORGANIZED HEALTH CARE EDUCATION/TRAINING PROGRAM

## 2023-04-14 PROCEDURE — 2025F 7 FLD RTA PHOTO W/O RTNOPTHY: CPT | Mod: CPTII,,, | Performed by: STUDENT IN AN ORGANIZED HEALTH CARE EDUCATION/TRAINING PROGRAM

## 2023-04-14 PROCEDURE — 99213 OFFICE O/P EST LOW 20 MIN: CPT | Mod: PBBFAC,PN | Performed by: STUDENT IN AN ORGANIZED HEALTH CARE EDUCATION/TRAINING PROGRAM

## 2023-04-14 PROCEDURE — 4010F PR ACE/ARB THEARPY RXD/TAKEN: ICD-10-PCS | Mod: CPTII,,, | Performed by: STUDENT IN AN ORGANIZED HEALTH CARE EDUCATION/TRAINING PROGRAM

## 2023-04-14 PROCEDURE — 3074F SYST BP LT 130 MM HG: CPT | Mod: CPTII,,, | Performed by: STUDENT IN AN ORGANIZED HEALTH CARE EDUCATION/TRAINING PROGRAM

## 2023-04-14 PROCEDURE — 3078F PR MOST RECENT DIASTOLIC BLOOD PRESSURE < 80 MM HG: ICD-10-PCS | Mod: CPTII,,, | Performed by: STUDENT IN AN ORGANIZED HEALTH CARE EDUCATION/TRAINING PROGRAM

## 2023-04-14 PROCEDURE — 3074F PR MOST RECENT SYSTOLIC BLOOD PRESSURE < 130 MM HG: ICD-10-PCS | Mod: CPTII,,, | Performed by: STUDENT IN AN ORGANIZED HEALTH CARE EDUCATION/TRAINING PROGRAM

## 2023-04-14 PROCEDURE — 4010F ACE/ARB THERAPY RXD/TAKEN: CPT | Mod: CPTII,,, | Performed by: STUDENT IN AN ORGANIZED HEALTH CARE EDUCATION/TRAINING PROGRAM

## 2023-04-14 PROCEDURE — 3066F NEPHROPATHY DOC TX: CPT | Mod: CPTII,,, | Performed by: STUDENT IN AN ORGANIZED HEALTH CARE EDUCATION/TRAINING PROGRAM

## 2023-04-14 PROCEDURE — 92228 IMG RTA DETC/MNTR DS PHY/QHP: CPT | Mod: TC,PBBFAC,PN | Performed by: STUDENT IN AN ORGANIZED HEALTH CARE EDUCATION/TRAINING PROGRAM

## 2023-04-14 PROCEDURE — 3061F PR NEG MICROALBUMINURIA RESULT DOCUMENTED/REVIEW: ICD-10-PCS | Mod: CPTII,,, | Performed by: STUDENT IN AN ORGANIZED HEALTH CARE EDUCATION/TRAINING PROGRAM

## 2023-04-14 PROCEDURE — 3066F PR DOCUMENTATION OF TREATMENT FOR NEPHROPATHY: ICD-10-PCS | Mod: CPTII,,, | Performed by: STUDENT IN AN ORGANIZED HEALTH CARE EDUCATION/TRAINING PROGRAM

## 2023-04-14 PROCEDURE — 3078F DIAST BP <80 MM HG: CPT | Mod: CPTII,,, | Performed by: STUDENT IN AN ORGANIZED HEALTH CARE EDUCATION/TRAINING PROGRAM

## 2023-04-14 PROCEDURE — 99214 PR OFFICE/OUTPT VISIT, EST, LEVL IV, 30-39 MIN: ICD-10-PCS | Mod: S$PBB,,, | Performed by: STUDENT IN AN ORGANIZED HEALTH CARE EDUCATION/TRAINING PROGRAM

## 2023-04-14 PROCEDURE — 3061F NEG MICROALBUMINURIA REV: CPT | Mod: CPTII,,, | Performed by: STUDENT IN AN ORGANIZED HEALTH CARE EDUCATION/TRAINING PROGRAM

## 2023-04-14 RX ORDER — LACTULOSE 10 G/15ML
10 SOLUTION ORAL; RECTAL DAILY
Qty: 450 ML | Refills: 11 | Status: SHIPPED | OUTPATIENT
Start: 2023-04-14 | End: 2023-08-11 | Stop reason: SDUPTHER

## 2023-04-14 RX ORDER — SEMAGLUTIDE 1.34 MG/ML
0.5 INJECTION, SOLUTION SUBCUTANEOUS
Qty: 4 EACH | Refills: 6 | Status: SHIPPED | OUTPATIENT
Start: 2023-04-14 | End: 2023-06-29

## 2023-04-14 NOTE — PROGRESS NOTES
Patient Name: Luh Chowdary   : 1990  MRN: [id number]     Subjective:   Patient ID: Luh Chowdary is a 33 y.o. female.    Chief Complaint:   Chief Complaint   Patient presents with    Follow-up        HPI: HPI  [age]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 to clinic for follow up of diabetes and issues with medication   Obesity  Has tried phentermine to lose weight in the past  States that she has started dieting and exercising and has lost 12 lb   Was also accepted into the weight loss seminar for bariatric surgery consideration but she is unsure at this time if she would like to do that     Diabetes  Last visit patient was started on Ozempic 0.25 mg and patient did report some difficulty with injections   Came here today for review of injection training  Does need to increase dose to 0.5 mg    Constipation   Anxiety depression possible PTSD/insomnia  Reports history of anxiety and depression acutely worsening in 2021 when her best friend was found dead unexpectedly  Also reports having 4 miscarriages which were very traumatizing for her  Her doctor had initially prescribed trazodone to help her sleep, Seroquel 25 mg to help her sleep but both made her too tired  To function the next morning also prescribed Wellbutrin  For anxiety and depression also prescribed Klonopin to take  When patient is having panic attacks  Most recent panic attack occurred  in which patient had to go to the hospital for chest pain had negative cardiac workup  No SI or HI   Zoloft titrated to 50 mg and sent referral to Dr. Gallegos  Switched to Lexapro 5 mg. Doing well. Doesn't wish to increase at this time.      Hypertension  Takes metoprolol ER 25 mg, HCTZ 25 mg, amlodipine 5 mg daily  Reports that she is having leg  Swelling which she attributed to the amlodipine of note patient has a solitary kidney having had 1 surgically removed secondary to infection  on Lisinopril hctz 20/25 with repeat /78  Reports no  "issues with medication                ROS:  Review of Systems   Constitutional:  Negative for chills and fever.   HENT:  Negative for sore throat.    Respiratory:  Negative for shortness of breath and wheezing.    Cardiovascular:  Negative for chest pain, palpitations and leg swelling.   Gastrointestinal:  Positive for constipation. Negative for diarrhea and heartburn.   Genitourinary:  Negative for frequency and urgency.   Musculoskeletal:  Negative for back pain and myalgias.   Skin:  Negative for itching and rash.   Neurological:  Negative for dizziness, focal weakness and headaches.   Psychiatric/Behavioral:  The patient is nervous/anxious.     History:     Past Medical History:   Diagnosis Date    Hypertension       Past Surgical History:   Procedure Laterality Date    SURGICAL REMOVAL OF BOTH KIDNEYS      TUBAL LIGATION       No family history on file.   Social History     Tobacco Use    Smoking status: Never     Passive exposure: Never    Smokeless tobacco: Never   Substance and Sexual Activity    Alcohol use: Not Currently    Drug use: Never    Sexual activity: Yes     Partners: Male        Allergies: Review of patient's allergies indicates:  No Known Allergies  Objective:     Vitals:    04/14/23 0855   BP: 119/77   Pulse: 97   Resp: 20   Temp: 97.5 °F (36.4 °C)   Weight: 116.6 kg (257 lb)   Height: 5' 7" (1.702 m)     Body mass index is 40.25 kg/m².     Physical Examination:   Physical Exam  Constitutional:       General: She is not in acute distress.  Eyes:      General: No scleral icterus.     Extraocular Movements: Extraocular movements intact.      Conjunctiva/sclera: Conjunctivae normal.      Pupils: Pupils are equal, round, and reactive to light.   Cardiovascular:      Rate and Rhythm: Normal rate and regular rhythm.      Heart sounds: No murmur heard.  Pulmonary:      Effort: Pulmonary effort is normal. No respiratory distress.      Breath sounds: No stridor. No wheezing or rhonchi.   Abdominal:      " Tenderness: There is no guarding.   Musculoskeletal:         General: No swelling. Normal range of motion.   Skin:     General: Skin is warm and dry.      Coloration: Skin is not jaundiced.      Findings: No rash.   Neurological:      Mental Status: She is alert.      Gait: Gait normal.   Psychiatric:         Thought Content: Thought content normal.       Assessment:     1. Type 2 diabetes mellitus without complication, without long-term current use of insulin    2. Other constipation    3. Primary insomnia        Plan:     Problem List Items Addressed This Visit          Endocrine    Type 2 diabetes mellitus without complication, without long-term current use of insulin - Primary    Overview     Increasing Ozempic to 0.5 mg every 7 days   Refer patient to Diabetes Education           Relevant Medications    semaglutide (OZEMPIC) 0.25 mg or 0.5 mg(2 mg/1.5 mL) pen injector    Other Relevant Orders    Diabetic Eye Screening Photo    Ambulatory referral/consult to Diabetes Education       GI    Other constipation    Overview     Adding lactulose 15 mL daily for patient              Other    Primary insomnia    Overview     Stable; Trazodone 50 mg added.              Follow up in about 3 months (around 7/14/2023) for Diabetes with A1c and foot exam .

## 2023-04-17 NOTE — PROGRESS NOTES
Luh Chowdary is a 33 y.o. female here for a diabetic eye screening with non-dilated fundus photos per Zara Barrett MD.    Patient cooperative?: Yes  Small pupils?: No  Last eye exam:     For exam results, see Encounter Report.

## 2023-04-24 ENCOUNTER — TELEPHONE (OUTPATIENT)
Dept: FAMILY MEDICINE | Facility: CLINIC | Age: 33
End: 2023-04-24

## 2023-05-29 ENCOUNTER — CLINICAL SUPPORT (OUTPATIENT)
Dept: DIABETES | Facility: CLINIC | Age: 33
End: 2023-05-29
Payer: MEDICAID

## 2023-05-29 ENCOUNTER — PATIENT MESSAGE (OUTPATIENT)
Dept: DIABETES | Facility: CLINIC | Age: 33
End: 2023-05-29

## 2023-05-29 VITALS — BODY MASS INDEX: 39 KG/M2 | WEIGHT: 249 LBS

## 2023-05-29 DIAGNOSIS — E11.9 TYPE 2 DIABETES MELLITUS WITHOUT COMPLICATION, WITHOUT LONG-TERM CURRENT USE OF INSULIN: ICD-10-CM

## 2023-05-29 PROCEDURE — 98960 PR SELF-MGMT EDUC & TRAIN, 1 PT, EA 30 MIN: ICD-10-PCS | Mod: 95,,, | Performed by: STUDENT IN AN ORGANIZED HEALTH CARE EDUCATION/TRAINING PROGRAM

## 2023-05-29 PROCEDURE — 98960 EDU&TRN PT SELF-MGMT NQHP 1: CPT | Mod: 95,,, | Performed by: STUDENT IN AN ORGANIZED HEALTH CARE EDUCATION/TRAINING PROGRAM

## 2023-05-29 NOTE — PROGRESS NOTES
Diabetes Care Specialist Virtual Visit Note       The patient location is: home in [place], [place]  The chief complaint leading to consultation is: Diabetes  Visit type: audiovisual  Total time spent with patient: 60 min   Each patient to whom he or she provides medical services by telemedicine is:  (1) informed of the relationship between the physician and patient and the respective role of any other health care provider with respect to management of the patient; and (2) notified that he or she may decline to receive medical services by telemedicine and may withdraw from such care at any time.     Diabetes Care Specialist Progress Note  Author: Melia Cates RD  Date: 5/29/2023    Program Intake  Reason for Diabetes Program Visit:: Initial Diabetes Assessment  Current diabetes risk level:: moderate    Lab Results   Component Value Date    HGBA1C 5.7 02/23/2023       Clinical         Problem Review  Active comorbidities affecting diabetes self-care.: yes  Comorbidities: Hypertension  Reviewed health maintenance: no (see comments)    Clinical Assessment  Current Diabetes Treatment: Injectable  Have you ever experienced hypoglycemia (low blood sugar)?: no  Have you ever experienced hyperglycemia (high blood sugar)?: no    Medication Information  How do you obtain your medications?: Patient drives  How many days a week do you miss your medications?: Never  Do you sometimes have difficulty refilling your medications?: No  Medication adherence impacting ability to self-manage diabetes?: No    Labs  Do you have regular lab work to monitor your medications?: Yes  Lab Compliance Barriers: No    Nutritional Status  Meal Plan 24 Hour Recall: Breakfast, Lunch, Dinner, Snack  Meal Plan 24 Hour Recall - Breakfast: smoothie or breakfast biscuit with egg and cheese  Meal Plan 24 Hour Recall - 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  Meal Plan 24 Hour  Recall - Snack: fruit. Is restricting her soda intake to <20 oz per day from 60 oz per day  Change in appetite?: Yes  Recent Changes in Weight: Weight Loss (8# x 2 mths)  Was weight loss intentional or unintentional?: Intentional  Current nutritional status an area of need that is impacting patient's ability to self-manage diabetes?: No    Additional Social History    Support  Does anyone support you with your diabetes care?: yes  Who supports you?: spouse  Who takes you to your medical appointments?: self  Does the current support meet the patient's needs?: Yes  Is Support an area impacting ability to self-manage diabetes?: No    Access to Mass Media & Technology  Does the patient have access to any of the following devices or technologies?: Smart phone  Media or technology needs impacting ability to self-manage diabetes?: No    Cognitive/Behavioral Health  Alert and Oriented: Yes  Difficulty Thinking: No  Requires Prompting: No  Requires assistance for routine expression?: No  Cognitive or behavioral barriers impacting ability to self-manage diabetes?: No         Communication  Language preference: English  Hearing Problems: No  Vision Problems: No  Communication needs impacting ability to self-manage diabetes?: No    Health Literacy  How often do you need to have someone help you read instructions, pamphlets, or written material from your doctor or pharmacy?: Never  Health literacy needs impacting ability to self-manage diabetes?: No      Diabetes Self-Management Skills Assessment    Diabetes Disease Process/Treatment Options  Patient/caregiver knows what type of diabetes they have.: yes  Diabetes Type : Type II  Patient/caregiver able to identify at least three signs and symptoms of diabetes.: no  Diabetes Disease Process/Treatment Options: Skills Assessment Completed: Yes  Assessment indicates:: Instruction Needed  Area of need?: Yes    Nutrition/Healthy Eating  Patient can identify foods that impact blood  sugar.: yes  Patient-identified foods:: starches (bread, pasta, rice, cereal), fruit/fruit juice, starchy vegetables (corn, peas, beans), sweets  Nutrition/Healthy Eating Skills Assessment Completed:: No  Deffered due to:: Time    Physical Activity/Exercise  Patient's daily activity level:: lightly active  Patient formally exercises outside of work.: no  Patient can identify forms of physical activity.: yes  Stated forms of physical activity:: any movement performed by muscles that uses energy, moving to burn calories  Physical Activity/Exercise Skills Assessment Completed: : Yes  Assessment indicates:: Instruction Needed  Area of need?: Yes    Medications  Patient is able to describe current diabetes management routine.: yes  Diabetes management routine:: injectable medications, diet  Patient is able to identify current diabetes medications, dosages, and appropriate timing of medications.: yes (Ozempic .5 mg)  Patient understands the purpose of the medications taken for diabetes.: no  Medication Skills Assessment Completed:: Yes  Assessment indicates:: Instruction Needed  Area of need?: Yes    Home Blood Glucose Monitoring  Patient states that blood sugar is checked at home daily.: yes  Home Blood Glucose Monitoring Skills Assessment Completed: : Yes  Assessment indicates:: Instruction Needed  Area of need?: Yes    Acute Complications  Patient is able to identify types of acute complications: No  Acute Complications Skills Assessment Completed: : No  Deffered due to:: Time    Chronic Complications  Patient can identify major chronic complications of diabetes.: yes  Stated chronic complications:: kidney disease  Patient can identify ways to prevent or delay diabetes complications.: yes  Stated ways to prevent complications:: maintaining optimal blood glucose control  Chronic Complications Skills Assessment Completed: : No  Deferred due to:: Time    Psychosocial/Coping  Patient can identify ways of coping with  chronic disease.: yes  Patient-stated ways of coping with chronic disease:: support from loved ones  Psychosocial/Coping Skills Assessment Completed: : Yes  Assessment indicates:: Adequate understanding  Area of need?: No      Diabetes Self Support Plan         Assessment Summary and Plan    Based on today's diabetes care assessment, the following areas of need were identified:      Social 5/29/2023   Support No   Access to Mass Media/Tech No   Cognitive/Behavioral Health No   Communication No   Health Literacy No        Clinical 5/29/2023   Medication Adherence No   Lab Compliance No   Nutritional Status No        Diabetes Self-Management Skills 5/29/2023   Diabetes Disease Process/Treatment Options Yes - Reviewed diabetes pathophysiology, different types of diabetes, signs and symptoms, risk factors and treatment guidelines.     Physical Activity/Exercise Yes - see care plan   Medication Yes - Provided review of current diabetes medication action, dosage, frequency, side effects, and storage guidelines.    Home Blood Glucose Monitoring Yes - Pt is not keeping a log. Glucose log provided via pt portal.   Psychosocial/Coping No          Today's interventions were provided through individual discussion, instruction, and written materials were provided.  Pt states that she has cut back on soda and is working toward continued improvement. She is not currently exercising and would like to focus on this area by walking and biking with her  and kids. Educated on glycemic effects of regular exercise. Reports FBS <120. Denies symptoms of hypoglycemia. She has increased Ozempic to .5 mg x 2 weeks and is controlling constipation with lactulose and increased water intake. Current A1C in goal. Reviewed What is Diabetes, A1C levels and target goals, non-starchy vegetables and increasing activity. Will f/u on further diet education/carb counting. Also f/u on ABC's of diabetes. Issued Diabetes Management Guide.    Patient  verbalized understanding of instruction and written materials.  Pt was able to return back demonstration of instructions today. Patient understood key points, needs reinforcement and further instruction.     Diabetes Self-Management Care Plan:    Today's Diabetes Self-Management Care Plan was developed with Luh's input. Luh has agreed to work toward the following goal(s) to improve his/her overall diabetes control.      Care Plan: Diabetes Management   Updates made since 4/29/2023 12:00 AM        Problem: Physical Activity and Exercise         Goal: Patient agrees to increase physical activity to a goal of 3 times per week for 30 minutes.    Start Date: 5/29/2023   Priority: High   Barriers: No Barriers Identified        Task: Discussed role of physical activity on reducing insulin resistance and improvement in overall glycemic control. Completed 5/29/2023        Task: Offered suggestions on how patient could increase their regular physical activity Completed 5/29/2023          Follow Up Plan     Follow up in about 3 months (around 8/29/2023) for glucose log review, health maintenance, nutrition, activity.    Today's care plan and follow up schedule was discussed with patient.  Luh verbalized understanding of the care plan, goals, and agrees to follow up plan.        The patient was encouraged to communicate with his/her health care provider/physician and care team regarding his/her condition(s) and treatment.  I provided the patient with my contact information today and encouraged to contact me via phone or [facility] Patient Portal as needed.     Length of Visit   Total Time: 60 Minutes

## 2023-06-20 ENCOUNTER — TELEPHONE (OUTPATIENT)
Dept: FAMILY MEDICINE | Facility: CLINIC | Age: 33
End: 2023-06-20

## 2023-06-21 ENCOUNTER — PATIENT MESSAGE (OUTPATIENT)
Dept: FAMILY MEDICINE | Facility: CLINIC | Age: 33
End: 2023-06-21
Payer: MEDICAID

## 2023-06-21 ENCOUNTER — TELEPHONE (OUTPATIENT)
Dept: FAMILY MEDICINE | Facility: CLINIC | Age: 33
End: 2023-06-21
Payer: MEDICAID

## 2023-06-21 ENCOUNTER — TELEPHONE (OUTPATIENT)
Dept: FAMILY MEDICINE | Facility: CLINIC | Age: 33
End: 2023-06-21

## 2023-06-23 NOTE — TELEPHONE ENCOUNTER
Please let patient know that Ozempic 0.5 mg is actually a higher dose than 0.25 mg. That is the lower dose.  If she needs to come in and discuss this I am happy to see her.

## 2023-06-29 ENCOUNTER — OFFICE VISIT (OUTPATIENT)
Dept: FAMILY MEDICINE | Facility: CLINIC | Age: 33
End: 2023-06-29
Payer: MEDICAID

## 2023-06-29 VITALS
DIASTOLIC BLOOD PRESSURE: 75 MMHG | SYSTOLIC BLOOD PRESSURE: 110 MMHG | TEMPERATURE: 98 F | WEIGHT: 248.63 LBS | BODY MASS INDEX: 39.02 KG/M2 | RESPIRATION RATE: 20 BRPM | HEART RATE: 82 BPM | HEIGHT: 67 IN | OXYGEN SATURATION: 100 %

## 2023-06-29 DIAGNOSIS — F32.A ANXIETY AND DEPRESSION: ICD-10-CM

## 2023-06-29 DIAGNOSIS — E11.9 TYPE 2 DIABETES MELLITUS WITHOUT COMPLICATION, WITHOUT LONG-TERM CURRENT USE OF INSULIN: Primary | ICD-10-CM

## 2023-06-29 DIAGNOSIS — Z90.5 SOLITARY KIDNEY, ACQUIRED: ICD-10-CM

## 2023-06-29 DIAGNOSIS — F41.9 ANXIETY AND DEPRESSION: ICD-10-CM

## 2023-06-29 PROCEDURE — 3078F DIAST BP <80 MM HG: CPT | Mod: CPTII,,, | Performed by: STUDENT IN AN ORGANIZED HEALTH CARE EDUCATION/TRAINING PROGRAM

## 2023-06-29 PROCEDURE — 99214 OFFICE O/P EST MOD 30 MIN: CPT | Mod: S$PBB,,, | Performed by: STUDENT IN AN ORGANIZED HEALTH CARE EDUCATION/TRAINING PROGRAM

## 2023-06-29 PROCEDURE — 3008F PR BODY MASS INDEX (BMI) DOCUMENTED: ICD-10-PCS | Mod: CPTII,,, | Performed by: STUDENT IN AN ORGANIZED HEALTH CARE EDUCATION/TRAINING PROGRAM

## 2023-06-29 PROCEDURE — 3061F PR NEG MICROALBUMINURIA RESULT DOCUMENTED/REVIEW: ICD-10-PCS | Mod: CPTII,,, | Performed by: STUDENT IN AN ORGANIZED HEALTH CARE EDUCATION/TRAINING PROGRAM

## 2023-06-29 PROCEDURE — 3066F PR DOCUMENTATION OF TREATMENT FOR NEPHROPATHY: ICD-10-PCS | Mod: CPTII,,, | Performed by: STUDENT IN AN ORGANIZED HEALTH CARE EDUCATION/TRAINING PROGRAM

## 2023-06-29 PROCEDURE — 3078F PR MOST RECENT DIASTOLIC BLOOD PRESSURE < 80 MM HG: ICD-10-PCS | Mod: CPTII,,, | Performed by: STUDENT IN AN ORGANIZED HEALTH CARE EDUCATION/TRAINING PROGRAM

## 2023-06-29 PROCEDURE — 1159F MED LIST DOCD IN RCRD: CPT | Mod: CPTII,,, | Performed by: STUDENT IN AN ORGANIZED HEALTH CARE EDUCATION/TRAINING PROGRAM

## 2023-06-29 PROCEDURE — 1159F PR MEDICATION LIST DOCUMENTED IN MEDICAL RECORD: ICD-10-PCS | Mod: CPTII,,, | Performed by: STUDENT IN AN ORGANIZED HEALTH CARE EDUCATION/TRAINING PROGRAM

## 2023-06-29 PROCEDURE — 4010F ACE/ARB THERAPY RXD/TAKEN: CPT | Mod: CPTII,,, | Performed by: STUDENT IN AN ORGANIZED HEALTH CARE EDUCATION/TRAINING PROGRAM

## 2023-06-29 PROCEDURE — 3061F NEG MICROALBUMINURIA REV: CPT | Mod: CPTII,,, | Performed by: STUDENT IN AN ORGANIZED HEALTH CARE EDUCATION/TRAINING PROGRAM

## 2023-06-29 PROCEDURE — 99214 OFFICE O/P EST MOD 30 MIN: CPT | Mod: PBBFAC,PN | Performed by: STUDENT IN AN ORGANIZED HEALTH CARE EDUCATION/TRAINING PROGRAM

## 2023-06-29 PROCEDURE — 3066F NEPHROPATHY DOC TX: CPT | Mod: CPTII,,, | Performed by: STUDENT IN AN ORGANIZED HEALTH CARE EDUCATION/TRAINING PROGRAM

## 2023-06-29 PROCEDURE — 99214 PR OFFICE/OUTPT VISIT, EST, LEVL IV, 30-39 MIN: ICD-10-PCS | Mod: S$PBB,,, | Performed by: STUDENT IN AN ORGANIZED HEALTH CARE EDUCATION/TRAINING PROGRAM

## 2023-06-29 PROCEDURE — 3074F PR MOST RECENT SYSTOLIC BLOOD PRESSURE < 130 MM HG: ICD-10-PCS | Mod: CPTII,,, | Performed by: STUDENT IN AN ORGANIZED HEALTH CARE EDUCATION/TRAINING PROGRAM

## 2023-06-29 PROCEDURE — 4010F PR ACE/ARB THEARPY RXD/TAKEN: ICD-10-PCS | Mod: CPTII,,, | Performed by: STUDENT IN AN ORGANIZED HEALTH CARE EDUCATION/TRAINING PROGRAM

## 2023-06-29 PROCEDURE — 3074F SYST BP LT 130 MM HG: CPT | Mod: CPTII,,, | Performed by: STUDENT IN AN ORGANIZED HEALTH CARE EDUCATION/TRAINING PROGRAM

## 2023-06-29 PROCEDURE — 3008F BODY MASS INDEX DOCD: CPT | Mod: CPTII,,, | Performed by: STUDENT IN AN ORGANIZED HEALTH CARE EDUCATION/TRAINING PROGRAM

## 2023-06-29 RX ORDER — SEMAGLUTIDE 1.34 MG/ML
1 INJECTION, SOLUTION SUBCUTANEOUS
Qty: 4 ML | Refills: 11 | Status: SHIPPED | OUTPATIENT
Start: 2023-06-29 | End: 2023-08-11 | Stop reason: SDUPTHER

## 2023-06-29 RX ORDER — HYDROXYZINE PAMOATE 25 MG/1
25 CAPSULE ORAL EVERY 8 HOURS PRN
Qty: 90 CAPSULE | Refills: 6 | Status: SHIPPED | OUTPATIENT
Start: 2023-06-29 | End: 2023-08-11 | Stop reason: SDUPTHER

## 2023-06-29 RX ORDER — ESCITALOPRAM OXALATE 20 MG/1
20 TABLET ORAL DAILY
Qty: 90 TABLET | Refills: 3 | Status: SHIPPED | OUTPATIENT
Start: 2023-06-29 | End: 2023-08-11 | Stop reason: SDUPTHER

## 2023-06-30 NOTE — PROGRESS NOTES
Patient Name: Luh Chowdary   : 1990  MRN: [id number]     Subjective:   Patient ID: Luh Chowdary is a 33 y.o. female.    Chief Complaint:   Chief Complaint   Patient presents with    Follow-up     C/o Ozempic  meds ,requesting to increase .        HPI: HPI  [age]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 to clinic for follow up of diabetes and issues with medication   Obesity  Has tried phentermine to lose weight in the past  States that she has started dieting and exercising and has lost 12 lb   Was also accepted into the weight loss seminar for bariatric surgery consideration but states that she would like to continue with ozempic and increase.   Diabetes  Ozempic now at  0.5 mg    Constipation   Lactulose 15 ml daily    Anxiety depression possible PTSD/insomnia  Reports history of anxiety and depression acutely worsening in 2021 when her best friend was found dead unexpectedly  Also reports having 4 miscarriages which were very traumatizing for her  Her doctor had initially prescribed trazodone to help her sleep, Seroquel 25 mg to help her sleep but both made her too tired  To function the next morning also prescribed Wellbutrin  For anxiety and depression also prescribed Klonopin to take  When patient is having panic attacks   Lexapro 20 mg, Trazodone 50 mg  Vistaril 25 mg q8hrs. PRN    Hypertension  Takes metoprolol ER 25 mg, HCTZ 25 mg, amlodipine 5 mg daily  Reports that she is having leg  Swelling which she attributed to the amlodipine of note patient has a solitary kidney having had 1 surgically removed secondary to infection  on Lisinopril hctz 20/25 with repeat /75  Reports no issues with medication                ROS:  Review of Systems   Constitutional:  Negative for chills and fever.   HENT:  Negative for sore throat.    Respiratory:  Negative for shortness of breath and wheezing.    Cardiovascular:  Negative for chest pain, palpitations and leg swelling.   Gastrointestinal:  Positive  "for constipation. Negative for diarrhea and heartburn.   Genitourinary:  Negative for frequency and urgency.   Musculoskeletal:  Negative for back pain and myalgias.   Skin:  Negative for itching and rash.   Neurological:  Negative for dizziness, focal weakness and headaches.   Psychiatric/Behavioral:  The patient is nervous/anxious.     History:     Past Medical History:   Diagnosis Date    Hypertension       Past Surgical History:   Procedure Laterality Date    SURGICAL REMOVAL OF BOTH KIDNEYS      TUBAL LIGATION       History reviewed. No pertinent family history.   Social History     Tobacco Use    Smoking status: Never     Passive exposure: Never    Smokeless tobacco: Never   Substance and Sexual Activity    Alcohol use: Not Currently    Drug use: Never    Sexual activity: Yes     Partners: Male        Allergies: Review of patient's allergies indicates:  No Known Allergies  Objective:     Vitals:    06/29/23 1014   BP: 110/75   Pulse: 82   Resp: 20   Temp: 98.4 °F (36.9 °C)   SpO2: 100%   Weight: 112.8 kg (248 lb 9.6 oz)   Height: 5' 7" (1.702 m)   PainSc: 0-No pain     Body mass index is 38.94 kg/m².     Physical Examination:   Physical Exam  Constitutional:       General: She is not in acute distress.  Eyes:      General: No scleral icterus.     Extraocular Movements: Extraocular movements intact.      Conjunctiva/sclera: Conjunctivae normal.      Pupils: Pupils are equal, round, and reactive to light.   Cardiovascular:      Rate and Rhythm: Normal rate and regular rhythm.      Heart sounds: No murmur heard.  Pulmonary:      Effort: Pulmonary effort is normal. No respiratory distress.      Breath sounds: No stridor. No wheezing or rhonchi.   Abdominal:      Tenderness: There is no guarding.   Musculoskeletal:         General: No swelling. Normal range of motion.   Skin:     General: Skin is warm and dry.      Coloration: Skin is not jaundiced.      Findings: No rash.   Neurological:      Mental Status: She is " alert.      Gait: Gait normal.   Psychiatric:         Thought Content: Thought content normal.       Assessment:     1. Type 2 diabetes mellitus without complication, without long-term current use of insulin    2. Anxiety and depression    3. Solitary kidney, acquired        Plan:     Problem List Items Addressed This Visit          Psychiatric    Anxiety and depression    Overview     Patient has not had formal evaluation but is amenable due to panic attack and possible PTSD for referral to behavioral health  Lexapro 20 mg daily  Vistaril 25 mg q 8 hrs. PRN anxiety             Relevant Medications    EScitalopram oxalate (LEXAPRO) 20 MG tablet    Other Relevant Orders    Ambulatory referral/consult to Behavioral Health       Renal/    Solitary kidney, acquired    Overview       Continue Metoprolol 25 mg daily and continue Lisinopril- HCTZ 20/25            Endocrine    Type 2 diabetes mellitus without complication, without long-term current use of insulin - Primary    Overview     Increasing Ozempic1 mg  every 7 days   Refer patient to Diabetes Education         Relevant Medications    semaglutide (OZEMPIC) 1 mg/dose (4 mg/3 mL)    Other Relevant Orders    Hemoglobin A1C      Follow up in about 1 month (around 7/29/2023) for Diabetes.

## 2023-07-07 ENCOUNTER — PATIENT MESSAGE (OUTPATIENT)
Dept: BEHAVIORAL HEALTH | Facility: CLINIC | Age: 33
End: 2023-07-07
Payer: MEDICAID

## 2023-07-12 ENCOUNTER — OFFICE VISIT (OUTPATIENT)
Dept: FAMILY MEDICINE | Facility: CLINIC | Age: 33
End: 2023-07-12
Payer: MEDICAID

## 2023-07-12 VITALS
WEIGHT: 248 LBS | HEIGHT: 67 IN | HEART RATE: 96 BPM | OXYGEN SATURATION: 100 % | RESPIRATION RATE: 22 BRPM | SYSTOLIC BLOOD PRESSURE: 112 MMHG | TEMPERATURE: 98 F | BODY MASS INDEX: 38.92 KG/M2 | DIASTOLIC BLOOD PRESSURE: 79 MMHG

## 2023-07-12 DIAGNOSIS — F32.1 MODERATE MAJOR DEPRESSION: Primary | ICD-10-CM

## 2023-07-12 DIAGNOSIS — F43.21 GRIEF: ICD-10-CM

## 2023-07-12 DIAGNOSIS — F41.1 GAD (GENERALIZED ANXIETY DISORDER): ICD-10-CM

## 2023-07-12 PROCEDURE — 99417 PROLNG OP E/M EACH 15 MIN: CPT | Mod: S$PBB,,, | Performed by: NURSE PRACTITIONER

## 2023-07-12 PROCEDURE — 99214 OFFICE O/P EST MOD 30 MIN: CPT | Mod: PBBFAC | Performed by: NURSE PRACTITIONER

## 2023-07-12 PROCEDURE — 3078F PR MOST RECENT DIASTOLIC BLOOD PRESSURE < 80 MM HG: ICD-10-PCS | Mod: CPTII,,, | Performed by: NURSE PRACTITIONER

## 2023-07-12 PROCEDURE — 99417 PR PROLONGED SVC, OUTPT, W/WO DIRECT PT CONTACT,  EA ADDTL 15 MIN: ICD-10-PCS | Mod: S$PBB,,, | Performed by: NURSE PRACTITIONER

## 2023-07-12 PROCEDURE — 99205 PR OFFICE/OUTPT VISIT, NEW, LEVL V, 60-74 MIN: ICD-10-PCS | Mod: S$PBB,,, | Performed by: NURSE PRACTITIONER

## 2023-07-12 PROCEDURE — 99205 OFFICE O/P NEW HI 60 MIN: CPT | Mod: S$PBB,,, | Performed by: NURSE PRACTITIONER

## 2023-07-12 PROCEDURE — 90838 PSYTX W PT W E/M 60 MIN: CPT | Mod: PBBFAC | Performed by: NURSE PRACTITIONER

## 2023-07-12 PROCEDURE — 3044F HG A1C LEVEL LT 7.0%: CPT | Mod: CPTII,,, | Performed by: NURSE PRACTITIONER

## 2023-07-12 PROCEDURE — 3066F PR DOCUMENTATION OF TREATMENT FOR NEPHROPATHY: ICD-10-PCS | Mod: CPTII,,, | Performed by: NURSE PRACTITIONER

## 2023-07-12 PROCEDURE — 3078F DIAST BP <80 MM HG: CPT | Mod: CPTII,,, | Performed by: NURSE PRACTITIONER

## 2023-07-12 PROCEDURE — 3061F PR NEG MICROALBUMINURIA RESULT DOCUMENTED/REVIEW: ICD-10-PCS | Mod: CPTII,,, | Performed by: NURSE PRACTITIONER

## 2023-07-12 PROCEDURE — 3074F PR MOST RECENT SYSTOLIC BLOOD PRESSURE < 130 MM HG: ICD-10-PCS | Mod: CPTII,,, | Performed by: NURSE PRACTITIONER

## 2023-07-12 PROCEDURE — 90838 PR PSYCHOTHERAPY W/PATIENT W/E&M, 60 MIN (ADD ON): ICD-10-PCS | Mod: S$PBB,,, | Performed by: NURSE PRACTITIONER

## 2023-07-12 PROCEDURE — 90838 PSYTX W PT W E/M 60 MIN: CPT | Mod: S$PBB,,, | Performed by: NURSE PRACTITIONER

## 2023-07-12 PROCEDURE — 4010F ACE/ARB THERAPY RXD/TAKEN: CPT | Mod: CPTII,,, | Performed by: NURSE PRACTITIONER

## 2023-07-12 PROCEDURE — 3066F NEPHROPATHY DOC TX: CPT | Mod: CPTII,,, | Performed by: NURSE PRACTITIONER

## 2023-07-12 PROCEDURE — 4010F PR ACE/ARB THEARPY RXD/TAKEN: ICD-10-PCS | Mod: CPTII,,, | Performed by: NURSE PRACTITIONER

## 2023-07-12 PROCEDURE — 3074F SYST BP LT 130 MM HG: CPT | Mod: CPTII,,, | Performed by: NURSE PRACTITIONER

## 2023-07-12 PROCEDURE — 3008F PR BODY MASS INDEX (BMI) DOCUMENTED: ICD-10-PCS | Mod: CPTII,,, | Performed by: NURSE PRACTITIONER

## 2023-07-12 PROCEDURE — 3008F BODY MASS INDEX DOCD: CPT | Mod: CPTII,,, | Performed by: NURSE PRACTITIONER

## 2023-07-12 PROCEDURE — 3044F PR MOST RECENT HEMOGLOBIN A1C LEVEL <7.0%: ICD-10-PCS | Mod: CPTII,,, | Performed by: NURSE PRACTITIONER

## 2023-07-12 PROCEDURE — 3061F NEG MICROALBUMINURIA REV: CPT | Mod: CPTII,,, | Performed by: NURSE PRACTITIONER
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
## 2023-07-12 NOTE — PATIENT INSTRUCTIONS
Meds as directed  Keep all healthcare appointments  Utilize self-interventions from patient education materials  Obtain Bernabe [employer]oo Anxiety & Worry workbook  and begin using  Call [facility] or go to nearest ER if overwhelmed   Clinic 4 weeks

## 2023-07-17 ENCOUNTER — OFFICE VISIT (OUTPATIENT)
Dept: FAMILY MEDICINE | Facility: CLINIC | Age: 33
End: 2023-07-17
Payer: MEDICAID

## 2023-07-17 DIAGNOSIS — F43.21 GRIEF: ICD-10-CM

## 2023-07-17 DIAGNOSIS — F41.1 GAD (GENERALIZED ANXIETY DISORDER): ICD-10-CM

## 2023-07-17 DIAGNOSIS — F32.1 MODERATE MAJOR DEPRESSION: ICD-10-CM

## 2023-07-17 PROCEDURE — 99215 OFFICE O/P EST HI 40 MIN: CPT | Mod: 95,,, | Performed by: NURSE PRACTITIONER

## 2023-07-17 PROCEDURE — 99215 PR OFFICE/OUTPT VISIT, EST, LEVL V, 40-54 MIN: ICD-10-PCS | Mod: 95,,, | Performed by: NURSE PRACTITIONER

## 2023-07-17 PROCEDURE — 4010F ACE/ARB THERAPY RXD/TAKEN: CPT | Mod: CPTII,95,, | Performed by: NURSE PRACTITIONER

## 2023-07-17 PROCEDURE — 4010F PR ACE/ARB THEARPY RXD/TAKEN: ICD-10-PCS | Mod: CPTII,95,, | Performed by: NURSE PRACTITIONER

## 2023-07-17 PROCEDURE — 3044F HG A1C LEVEL LT 7.0%: CPT | Mod: CPTII,95,, | Performed by: NURSE PRACTITIONER

## 2023-07-17 PROCEDURE — 3044F PR MOST RECENT HEMOGLOBIN A1C LEVEL <7.0%: ICD-10-PCS | Mod: CPTII,95,, | Performed by: NURSE PRACTITIONER

## 2023-07-17 PROCEDURE — 3061F PR NEG MICROALBUMINURIA RESULT DOCUMENTED/REVIEW: ICD-10-PCS | Mod: CPTII,95,, | Performed by: NURSE PRACTITIONER

## 2023-07-17 PROCEDURE — 3066F NEPHROPATHY DOC TX: CPT | Mod: CPTII,95,, | Performed by: NURSE PRACTITIONER

## 2023-07-17 PROCEDURE — 1159F PR MEDICATION LIST DOCUMENTED IN MEDICAL RECORD: ICD-10-PCS | Mod: CPTII,95,, | Performed by: NURSE PRACTITIONER

## 2023-07-17 PROCEDURE — 99417 PR PROLONGED SVC, OUTPT, W/WO DIRECT PT CONTACT,  EA ADDTL 15 MIN: ICD-10-PCS | Mod: 95,,, | Performed by: NURSE PRACTITIONER

## 2023-07-17 PROCEDURE — 90838 PSYTX W PT W E/M 60 MIN: CPT | Mod: 95,,, | Performed by: NURSE PRACTITIONER

## 2023-07-17 PROCEDURE — 99417 PROLNG OP E/M EACH 15 MIN: CPT | Mod: 95,,, | Performed by: NURSE PRACTITIONER

## 2023-07-17 PROCEDURE — 90838 PR PSYCHOTHERAPY W/PATIENT W/E&M, 60 MIN (ADD ON): ICD-10-PCS | Mod: 95,,, | Performed by: NURSE PRACTITIONER

## 2023-07-17 PROCEDURE — 1159F MED LIST DOCD IN RCRD: CPT | Mod: CPTII,95,, | Performed by: NURSE PRACTITIONER

## 2023-07-17 PROCEDURE — 3061F NEG MICROALBUMINURIA REV: CPT | Mod: CPTII,95,, | Performed by: NURSE PRACTITIONER

## 2023-07-17 PROCEDURE — 3066F PR DOCUMENTATION OF TREATMENT FOR NEPHROPATHY: ICD-10-PCS | Mod: CPTII,95,, | Performed by: NURSE PRACTITIONER

## 2023-07-17 NOTE — PATIENT INSTRUCTIONS
Meds as directed  Keep all healthcare appointments  Utilize self-interventions from patient education materials  Call [facility] or go to nearest ER if overwhelmed   Clinic 4 weeks  Obtain  [employer] Workbook.  Begin reading and doing exercises.
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
## 2023-07-31 ENCOUNTER — OFFICE VISIT (OUTPATIENT)
Dept: FAMILY MEDICINE | Facility: CLINIC | Age: 33
End: 2023-07-31
Payer: MEDICAID

## 2023-07-31 DIAGNOSIS — F32.A ANXIETY AND DEPRESSION: ICD-10-CM

## 2023-07-31 DIAGNOSIS — F41.9 ANXIETY AND DEPRESSION: ICD-10-CM

## 2023-07-31 DIAGNOSIS — K59.09 OTHER CONSTIPATION: ICD-10-CM

## 2023-07-31 DIAGNOSIS — E11.9 TYPE 2 DIABETES MELLITUS WITHOUT COMPLICATION, WITHOUT LONG-TERM CURRENT USE OF INSULIN: ICD-10-CM

## 2023-07-31 DIAGNOSIS — I10 PRIMARY HYPERTENSION: ICD-10-CM

## 2023-07-31 PROCEDURE — 3061F NEG MICROALBUMINURIA REV: CPT | Mod: CPTII,95,, | Performed by: STUDENT IN AN ORGANIZED HEALTH CARE EDUCATION/TRAINING PROGRAM

## 2023-07-31 PROCEDURE — 99213 OFFICE O/P EST LOW 20 MIN: CPT | Mod: 95,,, | Performed by: STUDENT IN AN ORGANIZED HEALTH CARE EDUCATION/TRAINING PROGRAM

## 2023-07-31 PROCEDURE — 3066F PR DOCUMENTATION OF TREATMENT FOR NEPHROPATHY: ICD-10-PCS | Mod: CPTII,95,, | Performed by: STUDENT IN AN ORGANIZED HEALTH CARE EDUCATION/TRAINING PROGRAM

## 2023-07-31 PROCEDURE — 4010F ACE/ARB THERAPY RXD/TAKEN: CPT | Mod: CPTII,95,, | Performed by: STUDENT IN AN ORGANIZED HEALTH CARE EDUCATION/TRAINING PROGRAM

## 2023-07-31 PROCEDURE — 4010F PR ACE/ARB THEARPY RXD/TAKEN: ICD-10-PCS | Mod: CPTII,95,, | Performed by: STUDENT IN AN ORGANIZED HEALTH CARE EDUCATION/TRAINING PROGRAM

## 2023-07-31 PROCEDURE — 3044F HG A1C LEVEL LT 7.0%: CPT | Mod: CPTII,95,, | Performed by: STUDENT IN AN ORGANIZED HEALTH CARE EDUCATION/TRAINING PROGRAM

## 2023-07-31 PROCEDURE — 3061F PR NEG MICROALBUMINURIA RESULT DOCUMENTED/REVIEW: ICD-10-PCS | Mod: CPTII,95,, | Performed by: STUDENT IN AN ORGANIZED HEALTH CARE EDUCATION/TRAINING PROGRAM

## 2023-07-31 PROCEDURE — 3066F NEPHROPATHY DOC TX: CPT | Mod: CPTII,95,, | Performed by: STUDENT IN AN ORGANIZED HEALTH CARE EDUCATION/TRAINING PROGRAM

## 2023-07-31 PROCEDURE — 99213 PR OFFICE/OUTPT VISIT, EST, LEVL III, 20-29 MIN: ICD-10-PCS | Mod: 95,,, | Performed by: STUDENT IN AN ORGANIZED HEALTH CARE EDUCATION/TRAINING PROGRAM

## 2023-07-31 PROCEDURE — 3044F PR MOST RECENT HEMOGLOBIN A1C LEVEL <7.0%: ICD-10-PCS | Mod: CPTII,95,, | Performed by: STUDENT IN AN ORGANIZED HEALTH CARE EDUCATION/TRAINING PROGRAM

## 2023-07-31 NOTE — PROGRESS NOTES
Audio Only Telehealth Visit     The patient location is: home   The chief complaint leading to consultation is: med refill  Visit type: Virtual visit with audio only (telephone)  Total time spent with patient: 15 minutes      The reason for the audio only service rather than synchronous audio and video virtual visit was related to technical difficulties or patient preference/necessity.     Each patient to whom I provide medical services by telemedicine is:  (1) informed of the relationship between the physician and patient and the respective role of any other health care provider with respect to management of the patient; and (2) notified that they may decline to receive medical services by telemedicine and may withdraw from such care at any time. Patient verbally consented to receive this service via voice-only telephone call.    Patient Name: Luh Chowdary   : 1990  MRN: [id number]     Subjective:   Patient ID: Luh Chowdary is a 33 y.o. female.    Chief Complaint: No chief complaint on file.       HPI: HPI  33year-old female presents for follow up of diabetes and issues with medication  AV portion of telemed stopped working. Had to convert to phone set up   Obesity  Has tried phentermine to lose weight in the past  States that she has started dieting and exercising and has lost 12 lb   Was also accepted into the weight loss seminar for bariatric surgery consideration but states that she would like to continue with ozempic and increase.   Diabetes  Ozempic now at  1 mg. Reports that she is doing well on this medication    Constipation   Lactulose 15 ml daily- working    Anxiety depression possible PTSD/insomnia  Reports history of anxiety and depression acutely worsening in 2021 when her best friend was found dead unexpectedly  Also reports having 4 miscarriages which were very traumatizing for her  Her doctor had initially prescribed trazodone to help her sleep, Seroquel 25 mg to help her  sleep but both made her too tired  To function the next morning also prescribed Wellbutrin  For anxiety and depression also prescribed Klonopin to take  When patient is having panic attacks   Lexapro 20 mg, Trazodone 50 mg  Vistaril 25 mg q8hrs. PRN  Has established with Mrs. 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   Social History     Tobacco Use    Smoking status: Never     Passive exposure: Never    Smokeless tobacco: Never   Substance and Sexual Activity    Alcohol use: Not Currently    Drug use: Never    Sexual activity: Yes     Partners: Male        Allergies: Review of patient's allergies indicates:  No Known Allergies  Objective:   There were no vitals filed for this visit.  There is no height or weight on file to calculate BMI.     Physical Examination:   Physical Exam  Able to speak in complete sentences. Thought content normal  Assessment:     Problem List Items Addressed This Visit    None      Plan:   There are no diagnoses linked to this encounter.   Follow up in about 2 months (around 9/30/2023) for Diabetes with A!c.     Problem List Items Addressed This Visit          Psychiatric    Anxiety and depression    Current Assessment & Plan     Improving   Symptoms most consistent at this time with anxiety   Lexapro 20 mg daily  Vistaril 25 mg q 8 hrs. PRN anxiety  Trazodone 50 mg       declining additional BuSpar            Cardiac/Vascular     Primary hypertension    Current Assessment & Plan     Continuing metoprolol 25 HCTZ lisinopril 20/25 started last visit   CMP with slightly elevated creatinine. Patient reports that she did not drink a lot of fluids that day and has been reminding herself to drink  Could be also slight increase with ACei  Will recheck next visit               Endocrine    Type 2 diabetes mellitus without complication, without long-term current use of insulin    Overview     I         Current Assessment & Plan     Doing well   Ozempic1 mg  every 7 days   Refer patient to Diabetes Education              GI    Other constipation    Current Assessment & Plan     Stable; continue lactulose 15 mL daily for patient         Follow up in about 2 months (around 9/30/2023) for Diabetes with A!c.   This note was created with the assistance of a voice recognition software or phone dictation. There may be transcription errors as a result of using this technology however minimal. Effort has been made to assure accuracy of transcription but any obvious errors or omissions should be clarified with the author of the document      This service was not originating from a related E/M service provided within the previous 7 days nor will  to an E/M service or procedure within the next 24 hours or my soonest available appointment.  Prevailing standard of care was able to be met in this audio-only visit.

## 2023-07-31 NOTE — ASSESSMENT & PLAN NOTE
Improving   Symptoms most consistent at this time with anxiety   Lexapro 20 mg daily  Vistaril 25 mg q 8 hrs. PRN anxiety  Trazodone 50 mg       declining additional BuSpar

## 2023-07-31 NOTE — ASSESSMENT & PLAN NOTE
Continuing metoprolol 25 HCTZ lisinopril 20/25 started last visit   CMP with slightly elevated creatinine. Patient reports that she did not drink a lot of fluids that day and has been reminding herself to drink  Could be also slight increase with ACei  Will recheck next visit

## 2023-08-11 DIAGNOSIS — I10 HYPERTENSION, UNSPECIFIED TYPE: ICD-10-CM

## 2023-08-11 DIAGNOSIS — F32.A ANXIETY AND DEPRESSION: ICD-10-CM

## 2023-08-11 DIAGNOSIS — F41.9 ANXIETY AND DEPRESSION: ICD-10-CM

## 2023-08-11 NOTE — TELEPHONE ENCOUNTER
Provider Staff- Action is required     If a refill request needs assessment, Please pend appropriate medication(s) for patient and route the refill request to the Centralized Refill Staff Pool for assessment.     If medication is already pended in a previous encounter, please add any call/ encounter notes to that previous encounter and route that encounter to the [facility] staff pool High Priority.     If medication is not pended as a Refill Request the data required for the Refill Center to assess will not generate and the medications will not be able to be assessed properly by the Refill Center.     Thank you for your assistance!   [facility]     Note composed:1:39 PM 08/11/2023

## 2023-08-14 DIAGNOSIS — I10 HYPERTENSION, UNSPECIFIED TYPE: ICD-10-CM

## 2023-08-14 RX ORDER — LISINOPRIL AND HYDROCHLOROTHIAZIDE 20; 25 MG/1; MG/1
1 TABLET ORAL DAILY
Qty: 90 TABLET | Refills: 1 | Status: SHIPPED | OUTPATIENT
Start: 2023-08-14 | End: 2024-02-16 | Stop reason: SDUPTHER

## 2023-08-14 NOTE — TELEPHONE ENCOUNTER
Care Due:                  Date            Visit Type   Department     Provider  --------------------------------------------------------------------------------                                EP -                              [facility]  Last Visit: 06-      CARE ([facility])   MARCIA Barrett                               -                              [facility]  Next Visit: 10-      CARE ([facility])   MARCIA Barrett                                                            Last  Test          Frequency    Reason                     Performed    Due Date  --------------------------------------------------------------------------------    HBA1C.......  6 months...  semaglutide..............  02- 08-    [facility] Embedded Care Due Messages. Reference number: [id number].   8/14/2023 2:32:38 PM CDT

## 2023-08-14 NOTE — TELEPHONE ENCOUNTER
Provider Staff:  Action required for this patient     A1c due    Please see care gap opportunities below in Care Due Message.    Thanks!  [facility]     Appointments      Date Provider   Last Visit   7/31/2023 Zara Barrett MD   Next Visit   10/16/2023 Zara Barrett MD     Refill Decision Note   Luh Chowdary  is requesting a refill authorization.  Brief Assessment and Rationale for Refill:  Approve     Medication Therapy Plan:         Comments:     Note composed:3:27 PM 08/14/2023

## 2023-08-16 RX ORDER — METOPROLOL SUCCINATE 25 MG/1
25 TABLET, EXTENDED RELEASE ORAL DAILY
Qty: 90 TABLET | Refills: 3 | Status: SHIPPED | OUTPATIENT
Start: 2023-08-16

## 2023-08-16 RX ORDER — TRAZODONE HYDROCHLORIDE 50 MG/1
50 TABLET ORAL NIGHTLY
Qty: 90 TABLET | Refills: 3 | Status: SHIPPED | OUTPATIENT
Start: 2023-08-16

## 2023-08-16 RX ORDER — LISINOPRIL AND HYDROCHLOROTHIAZIDE 20; 25 MG/1; MG/1
1 TABLET ORAL DAILY
Qty: 30 TABLET | Refills: 11 | OUTPATIENT
Start: 2023-08-16

## 2023-08-16 NOTE — TELEPHONE ENCOUNTER
No care due was identified.  [facility] Embedded Care Due Messages. Reference number: [id number].   8/16/2023 7:48:02 AM CDT

## 2023-08-16 NOTE — TELEPHONE ENCOUNTER
Refill Routing Note   Medication(s) are not appropriate for processing by [facility] for the following reason(s):      Medication outside of protocol: chronulac, vistaril  New or recently adjusted medication: ozempic, lexapro    [facility] action(s):  Defer  Route  Quick Discontinue  Approve Care Due:  Labs due: a1c, ordered, may require staff scheduling assistance          Appointments  past 12m or future 3m with PCP    Date Provider   Last Visit   7/31/2023 Zara Barrett MD   Next Visit   8/14/2023 Zara Barrett MD   ED visits in past 90 days: 0        Note composed:8:10 AM 08/16/2023

## 2023-08-18 RX ORDER — LACTULOSE 10 G/15ML
10 SOLUTION ORAL; RECTAL DAILY
Qty: 450 ML | Refills: 11 | Status: SHIPPED | OUTPATIENT
Start: 2023-08-18

## 2023-08-18 RX ORDER — SEMAGLUTIDE 1.34 MG/ML
1 INJECTION, SOLUTION SUBCUTANEOUS
Qty: 9 ML | Refills: 0 | Status: SHIPPED | OUTPATIENT
Start: 2023-08-18 | End: 2024-02-16 | Stop reason: SDUPTHER

## 2023-08-18 RX ORDER — ESCITALOPRAM OXALATE 20 MG/1
20 TABLET ORAL DAILY
Qty: 90 TABLET | Refills: 3 | Status: SHIPPED | OUTPATIENT
Start: 2023-08-18 | End: 2024-08-17

## 2023-08-18 RX ORDER — HYDROXYZINE PAMOATE 25 MG/1
25 CAPSULE ORAL EVERY 8 HOURS PRN
Qty: 90 CAPSULE | Refills: 6 | Status: SHIPPED | OUTPATIENT
Start: 2023-08-18 | End: 2024-02-16 | Stop reason: SDUPTHER

## 2023-09-15 DIAGNOSIS — E11.9 TYPE 2 DIABETES MELLITUS WITHOUT COMPLICATION, WITHOUT LONG-TERM CURRENT USE OF INSULIN: Primary | ICD-10-CM

## 2023-09-15 RX ORDER — METFORMIN HYDROCHLORIDE 500 MG/1
500 TABLET, EXTENDED RELEASE ORAL 2 TIMES DAILY WITH MEALS
Qty: 180 TABLET | Refills: 3 | Status: SHIPPED | OUTPATIENT
Start: 2023-09-15 | End: 2023-10-17

## 2023-09-21 ENCOUNTER — PATIENT MESSAGE (OUTPATIENT)
Dept: FAMILY MEDICINE | Facility: CLINIC | Age: 33
End: 2023-09-21
Payer: COMMERCIAL

## 2023-10-17 ENCOUNTER — OFFICE VISIT (OUTPATIENT)
Dept: FAMILY MEDICINE | Facility: CLINIC | Age: 33
End: 2023-10-17
Payer: MEDICAID

## 2023-10-17 VITALS
WEIGHT: 232 LBS | BODY MASS INDEX: 36.41 KG/M2 | TEMPERATURE: 98 F | RESPIRATION RATE: 20 BRPM | SYSTOLIC BLOOD PRESSURE: 108 MMHG | HEART RATE: 96 BPM | DIASTOLIC BLOOD PRESSURE: 74 MMHG | HEIGHT: 67 IN

## 2023-10-17 DIAGNOSIS — F41.9 ANXIETY AND DEPRESSION: ICD-10-CM

## 2023-10-17 DIAGNOSIS — F32.A ANXIETY AND DEPRESSION: ICD-10-CM

## 2023-10-17 DIAGNOSIS — I10 PRIMARY HYPERTENSION: ICD-10-CM

## 2023-10-17 DIAGNOSIS — N92.0 MENORRHAGIA WITH REGULAR CYCLE: ICD-10-CM

## 2023-10-17 DIAGNOSIS — E11.9 TYPE 2 DIABETES MELLITUS WITHOUT COMPLICATION, WITHOUT LONG-TERM CURRENT USE OF INSULIN: Primary | ICD-10-CM

## 2023-10-17 LAB — HBA1C MFR BLD: 5.1 %

## 2023-10-17 PROCEDURE — 3078F DIAST BP <80 MM HG: CPT | Mod: CPTII,,, | Performed by: STUDENT IN AN ORGANIZED HEALTH CARE EDUCATION/TRAINING PROGRAM

## 2023-10-17 PROCEDURE — 99214 OFFICE O/P EST MOD 30 MIN: CPT | Mod: S$PBB,,, | Performed by: STUDENT IN AN ORGANIZED HEALTH CARE EDUCATION/TRAINING PROGRAM

## 2023-10-17 PROCEDURE — 3044F PR MOST RECENT HEMOGLOBIN A1C LEVEL <7.0%: ICD-10-PCS | Mod: CPTII,,, | Performed by: STUDENT IN AN ORGANIZED HEALTH CARE EDUCATION/TRAINING PROGRAM

## 2023-10-17 PROCEDURE — 3008F PR BODY MASS INDEX (BMI) DOCUMENTED: ICD-10-PCS | Mod: CPTII,,, | Performed by: STUDENT IN AN ORGANIZED HEALTH CARE EDUCATION/TRAINING PROGRAM

## 2023-10-17 PROCEDURE — 4010F PR ACE/ARB THEARPY RXD/TAKEN: ICD-10-PCS | Mod: CPTII,,, | Performed by: STUDENT IN AN ORGANIZED HEALTH CARE EDUCATION/TRAINING PROGRAM

## 2023-10-17 PROCEDURE — 3066F NEPHROPATHY DOC TX: CPT | Mod: CPTII,,, | Performed by: STUDENT IN AN ORGANIZED HEALTH CARE EDUCATION/TRAINING PROGRAM

## 2023-10-17 PROCEDURE — 3066F PR DOCUMENTATION OF TREATMENT FOR NEPHROPATHY: ICD-10-PCS | Mod: CPTII,,, | Performed by: STUDENT IN AN ORGANIZED HEALTH CARE EDUCATION/TRAINING PROGRAM

## 2023-10-17 PROCEDURE — 3074F SYST BP LT 130 MM HG: CPT | Mod: CPTII,,, | Performed by: STUDENT IN AN ORGANIZED HEALTH CARE EDUCATION/TRAINING PROGRAM

## 2023-10-17 PROCEDURE — 3078F PR MOST RECENT DIASTOLIC BLOOD PRESSURE < 80 MM HG: ICD-10-PCS | Mod: CPTII,,, | Performed by: STUDENT IN AN ORGANIZED HEALTH CARE EDUCATION/TRAINING PROGRAM

## 2023-10-17 PROCEDURE — 4010F ACE/ARB THERAPY RXD/TAKEN: CPT | Mod: CPTII,,, | Performed by: STUDENT IN AN ORGANIZED HEALTH CARE EDUCATION/TRAINING PROGRAM

## 2023-10-17 PROCEDURE — 1159F PR MEDICATION LIST DOCUMENTED IN MEDICAL RECORD: ICD-10-PCS | Mod: CPTII,,, | Performed by: STUDENT IN AN ORGANIZED HEALTH CARE EDUCATION/TRAINING PROGRAM

## 2023-10-17 PROCEDURE — 99214 PR OFFICE/OUTPT VISIT, EST, LEVL IV, 30-39 MIN: ICD-10-PCS | Mod: S$PBB,,, | Performed by: STUDENT IN AN ORGANIZED HEALTH CARE EDUCATION/TRAINING PROGRAM

## 2023-10-17 PROCEDURE — 3044F HG A1C LEVEL LT 7.0%: CPT | Mod: CPTII,,, | Performed by: STUDENT IN AN ORGANIZED HEALTH CARE EDUCATION/TRAINING PROGRAM

## 2023-10-17 PROCEDURE — 3074F PR MOST RECENT SYSTOLIC BLOOD PRESSURE < 130 MM HG: ICD-10-PCS | Mod: CPTII,,, | Performed by: STUDENT IN AN ORGANIZED HEALTH CARE EDUCATION/TRAINING PROGRAM

## 2023-10-17 PROCEDURE — 3008F BODY MASS INDEX DOCD: CPT | Mod: CPTII,,, | Performed by: STUDENT IN AN ORGANIZED HEALTH CARE EDUCATION/TRAINING PROGRAM

## 2023-10-17 PROCEDURE — 1159F MED LIST DOCD IN RCRD: CPT | Mod: CPTII,,, | Performed by: STUDENT IN AN ORGANIZED HEALTH CARE EDUCATION/TRAINING PROGRAM

## 2023-10-17 PROCEDURE — 3061F NEG MICROALBUMINURIA REV: CPT | Mod: CPTII,,, | Performed by: STUDENT IN AN ORGANIZED HEALTH CARE EDUCATION/TRAINING PROGRAM

## 2023-10-17 PROCEDURE — 83036 HEMOGLOBIN GLYCOSYLATED A1C: CPT | Mod: PBBFAC,PN | Performed by: STUDENT IN AN ORGANIZED HEALTH CARE EDUCATION/TRAINING PROGRAM

## 2023-10-17 PROCEDURE — 3061F PR NEG MICROALBUMINURIA RESULT DOCUMENTED/REVIEW: ICD-10-PCS | Mod: CPTII,,, | Performed by: STUDENT IN AN ORGANIZED HEALTH CARE EDUCATION/TRAINING PROGRAM

## 2023-10-17 PROCEDURE — 99214 OFFICE O/P EST MOD 30 MIN: CPT | Mod: PBBFAC,PN | Performed by: STUDENT IN AN ORGANIZED HEALTH CARE EDUCATION/TRAINING PROGRAM

## 2023-10-17 RX ORDER — BUSPIRONE HYDROCHLORIDE 15 MG/1
15 TABLET ORAL 3 TIMES DAILY
Qty: 90 TABLET | Refills: 11 | Status: SHIPPED | OUTPATIENT
Start: 2023-10-17 | End: 2024-02-16

## 2023-10-17 NOTE — PROGRESS NOTES
Patient Name: Luh Chowdary     : 1990    MRN: [id number]     Subjective:     Patient ID: Luh Chowdary is a 33 y.o. female.    Chief Complaint:   Chief Complaint   Patient presents with    Diabetes     SP 5 pulling,. cycle is about to start.    Back Pain     C/o Sp  5  Pt statee        HPI: HPI  33year-old female presents for follow up of diabetes and issues with menstrual cycle. Reports a lot of menstrual pain and cramping. Does not have current gynecologist  Reports that NuvaRing, Nexplanon, OCPs were tried but NuvaRing kept falling out Nexplanon did not controlled cycles and pills made patient nauseated   Would welcome referral to another gynecologist     Obesity  Diabetes  Ozempic now at  1 mg. 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 PRN  Has established with Mrs. 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 See HPI for details.    History:     Past Medical History:   Diagnosis Date    Anxiety      "Depression     Hypertension         Past Surgical History:   Procedure Laterality Date    SURGICAL REMOVAL OF BOTH KIDNEYS      TUBAL LIGATION         History reviewed. No pertinent family history.     Social History     Tobacco Use    Smoking status: Never     Passive exposure: Never    Smokeless tobacco: Never   Substance and Sexual Activity    Alcohol use: Not Currently    Drug use: Never    Sexual activity: Yes     Partners: Male       Current Outpatient Medications   Medication Instructions    busPIRone (BUSPAR) 15 mg, Oral, 3 times daily    EScitalopram oxalate (LEXAPRO) 20 mg, Oral, Daily, Take 1/2 tablet for first 7 days then may increase to whole tablet.    hydrOXYzine pamoate (VISTARIL) 25 mg, Oral, Every 8 hours PRN    lactulose (CHRONULAC) 10 g, Oral, Daily    lisinopriL-hydrochlorothiazide (PRINZIDE,ZESTORETIC) 20-25 mg Tab 1 tablet, Oral, Daily    metoprolol succinate (TOPROL-XL) 25 mg, Oral, Daily    OZEMPIC 1 mg, Subcutaneous, Every 7 days    traZODone (DESYREL) 50 mg, Oral, Nightly        Review of patient's allergies indicates:  No Known Allergies    Objective:     Visit Vitals  /74 (BP Location: Right arm, Patient Position: Sitting, BP Method: Medium (Automatic))   Pulse 96   Temp 97.8 °F (36.6 °C) (Oral)   Resp 20   Ht 5' 7" (1.702 m)   Wt 105.2 kg (232 lb)   LMP 09/20/2023   BMI 36.34 kg/m²       Physical Examination:     Physical Exam  Constitutional:       General: She is not in acute distress.  Eyes:      General: No scleral icterus.     Extraocular Movements: Extraocular movements intact.      Conjunctiva/sclera: Conjunctivae normal.      Pupils: Pupils are equal, round, and reactive to light.   Cardiovascular:      Rate and Rhythm: Normal rate and regular rhythm.      Heart sounds: No murmur heard.  Pulmonary:      Effort: Pulmonary effort is normal. No respiratory distress.      Breath sounds: No stridor. No wheezing or rhonchi.   Abdominal:      General: Bowel sounds are normal. " There is no distension.      Palpations: Abdomen is soft.      Tenderness: There is no abdominal tenderness. There is no guarding.   Musculoskeletal:         General: No swelling. Normal range of motion.   Skin:     General: Skin is warm and dry.      Coloration: Skin is not jaundiced.      Findings: No rash.   Neurological:      Mental Status: She is alert.      Gait: Gait normal.   Psychiatric:         Thought Content: Thought content normal.         Lab Results:     Chemistry:  Lab Results   Component Value Date     (L) 02/23/2023    K 4.1 02/23/2023    CHLORIDE 102 02/23/2023    BUN 12.1 02/23/2023    CREATININE 1.08 (H) 02/23/2023    EGFRNORACEVR >60 02/23/2023    GLUCOSE 91 02/23/2023    CALCIUM 9.6 02/23/2023    ALKPHOS 70 02/23/2023    LABPROT 7.6 02/23/2023    ALBUMIN 3.7 02/23/2023    BILIDIR 0.2 10/23/2020    IBILI 0.20 10/23/2020    AST 14 02/23/2023    ALT 15 02/23/2023    MG 1.9 07/29/2019    HORMBGBB91YH 12.7 (L) 06/24/2022    TSH 1.932 02/23/2023    JBPXWZ8XXLX 0.90 02/23/2023        Lab Results   Component Value Date    HGBA1C 5.7 02/23/2023        Hematology:  Lab Results   Component Value Date    WBC 4.5 02/23/2023    HGB 11.2 (L) 02/23/2023    HCT 36.2 (L) 02/23/2023     02/23/2023       Lipid Panel:  Lab Results   Component Value Date    CHOL 215 (H) 02/23/2023    HDL 33 (L) 02/23/2023    .00 (H) 02/23/2023    TRIG 161 (H) 02/23/2023    TOTALCHOLEST 7 (H) 02/23/2023        Urine:  Lab Results   Component Value Date    COLORUA Light-Yellow 02/23/2023    APPEARANCEUA Clear 02/23/2023    SGUA 1.020 02/23/2023    PHUA 6.5 02/23/2023    PROTEINUA Negative 02/23/2023    GLUCOSEUA Normal 02/23/2023    KETONESUA Negative 02/23/2023    BLOODUA Negative 02/23/2023    NITRITESUA Negative 02/23/2023    LEUKOCYTESUR 250 (A) 02/23/2023    RBCUA 0-5 02/23/2023    WBCUA 6-10 (A) 02/23/2023    BACTERIA Trace (A) 02/23/2023    SQEPUA Many (A) 02/23/2023    HYALINECASTS None Seen 02/23/2023     COLLINR 172.1 (H) 02/23/2023        Assessment:          ICD-10-CM ICD-9-CM   1. Type 2 diabetes mellitus without complication, without long-term current use of insulin  E11.9 250.00   2. Anxiety and depression  F41.9 300.00    F32.A 311   3. Primary hypertension  I10 401.9   4. Menorrhagia with regular cycle  N92.0 626.2        Plan:     1. Type 2 diabetes mellitus without complication, without long-term current use of insulin  Overview:  I    Assessment & Plan:  A1c has decreased may stop metformin 500 mg b.i.d. continue Ozempic 1 mg Q 7 days    Orders:  -     POCT HEMOGLOBIN A1C    2. Anxiety and depression  Assessment & Plan:  Continuing Lexapro 20 mg daily   Continue Vistaril p.r.n. anxiety   Adding BuSpar 15 mg t.i.d.  Patient may start titrating from 5 mg t.i.d.      Orders:  -     busPIRone (BUSPAR) 15 MG tablet; Take 1 tablet (15 mg total) by mouth 3 (three) times daily.  Dispense: 90 tablet; Refill: 11    3. Primary hypertension  Assessment & Plan:  Stable   Continue lisinopril hydrochlorothiazide 20/25 and metoprolol XL 25 mg daily      4. Menorrhagia with regular cycle  Assessment & Plan:  At this time patient does not wish to start her cycle would like referral to Gynecology   Referral given  Also starting ibuprofen 800 mg t.i.d. p.r.n. pain instructed patient to only take during her menstrual cycle    Orders:  -     Ambulatory referral/consult to Gynecology; Future; Expected date: 10/24/2023         Follow up in about 1 month (around 11/17/2023) for anxiety.    Future Appointments   Date Time Provider Department Center   11/1/2023 10:15 AM [facility], [facility] GYN [facility] GYN [facility]   11/7/2023  7:30 AM Azeb Cobos APRN [facility] FM RES [place]    11/29/2023  2:20 PM Zara Barrett MD [facility]        Zara Barrett MD

## 2023-10-17 NOTE — ASSESSMENT & PLAN NOTE
At this time patient does not wish to start her cycle would like referral to Gynecology   Referral given  Also starting ibuprofen 800 mg t.i.d. p.r.n. pain instructed patient to only take during her menstrual cycle

## 2023-10-17 NOTE — ASSESSMENT & PLAN NOTE
Continuing Lexapro 20 mg daily   Continue Vistaril p.r.n. anxiety   Adding BuSpar 15 mg t.i.d.  Patient may start titrating from 5 mg t.i.d.

## 2023-10-18 ENCOUNTER — TELEPHONE (OUTPATIENT)
Dept: FAMILY MEDICINE | Facility: CLINIC | Age: 33
End: 2023-10-18
Payer: COMMERCIAL

## 2023-10-18 DIAGNOSIS — N92.0 MENORRHAGIA WITH REGULAR CYCLE: Primary | ICD-10-CM

## 2023-10-18 RX ORDER — IBUPROFEN 800 MG/1
800 TABLET ORAL 3 TIMES DAILY
Qty: 90 TABLET | Refills: 3 | Status: SHIPPED | OUTPATIENT
Start: 2023-10-18

## 2023-10-18 NOTE — TELEPHONE ENCOUNTER
----- Message from Laura Zhou sent at 10/18/2023  9:18 AM CDT -----  MEDICATION REFILL REQUEST:     Call urgency: routine    Patient name: Luh Chowdary    Patient phone number:  [phone]    Requested Medication: Ibuprofen 800 for men cycle pain     Has Pt contacted Pharmacy : yes    Preferred Pharmacy: [facility] [id number] in [place]    Last completed appt date: YESTERDAY - 10/17/2023    ThanksLaura  10/18/2023, 9:18 AM

## 2023-11-29 ENCOUNTER — OFFICE VISIT (OUTPATIENT)
Dept: FAMILY MEDICINE | Facility: CLINIC | Age: 33
End: 2023-11-29
Payer: MEDICAID

## 2023-11-29 DIAGNOSIS — Z00.00 WELLNESS EXAMINATION: Primary | ICD-10-CM

## 2023-11-29 PROCEDURE — 99499 NO LOS: ICD-10-PCS | Mod: 95,,, | Performed by: STUDENT IN AN ORGANIZED HEALTH CARE EDUCATION/TRAINING PROGRAM

## 2023-11-29 PROCEDURE — 99499 UNLISTED E&M SERVICE: CPT | Mod: 95,,, | Performed by: STUDENT IN AN ORGANIZED HEALTH CARE EDUCATION/TRAINING PROGRAM

## 2023-12-06 ENCOUNTER — TELEPHONE (OUTPATIENT)
Dept: FAMILY MEDICINE | Facility: CLINIC | Age: 33
End: 2023-12-06
Payer: COMMERCIAL

## 2023-12-06 NOTE — TELEPHONE ENCOUNTER
----- Message from Salma Sandoval sent at 12/6/2023 11:48 AM CST -----  Regarding: Pharmacy transfer  Pt called, she changed her insurance so she needs all her rxs transferred over to [facility] in [place]     
No

## 2024-01-04 ENCOUNTER — TELEPHONE (OUTPATIENT)
Dept: FAMILY MEDICINE | Facility: CLINIC | Age: 34
End: 2024-01-04
Payer: COMMERCIAL

## 2024-01-04 NOTE — TELEPHONE ENCOUNTER
Spoke with Dr. Barrett and patient and let the patient know that Dr. Barrett would like her to go to the ER and get evaluated. This is to make sure that her blood pressure and heart rate is fine and to evaluate her for any other problems  she could be having. Patient understood and will follow up in ED.

## 2024-02-16 ENCOUNTER — OFFICE VISIT (OUTPATIENT)
Dept: FAMILY MEDICINE | Facility: CLINIC | Age: 34
End: 2024-02-16
Payer: COMMERCIAL

## 2024-02-16 VITALS
HEART RATE: 85 BPM | WEIGHT: 217 LBS | DIASTOLIC BLOOD PRESSURE: 73 MMHG | SYSTOLIC BLOOD PRESSURE: 108 MMHG | BODY MASS INDEX: 34.06 KG/M2 | HEIGHT: 67 IN | TEMPERATURE: 99 F | OXYGEN SATURATION: 100 %

## 2024-02-16 DIAGNOSIS — F51.01 PRIMARY INSOMNIA: ICD-10-CM

## 2024-02-16 DIAGNOSIS — F32.A ANXIETY AND DEPRESSION: Primary | ICD-10-CM

## 2024-02-16 DIAGNOSIS — E11.9 TYPE 2 DIABETES MELLITUS WITHOUT COMPLICATION, WITHOUT LONG-TERM CURRENT USE OF INSULIN: ICD-10-CM

## 2024-02-16 DIAGNOSIS — I10 HYPERTENSION, UNSPECIFIED TYPE: ICD-10-CM

## 2024-02-16 DIAGNOSIS — R30.0 DYSURIA: ICD-10-CM

## 2024-02-16 DIAGNOSIS — F41.9 ANXIETY AND DEPRESSION: Primary | ICD-10-CM

## 2024-02-16 DIAGNOSIS — Z90.5 SOLITARY KIDNEY, ACQUIRED: ICD-10-CM

## 2024-02-16 DIAGNOSIS — I10 ESSENTIAL (PRIMARY) HYPERTENSION: ICD-10-CM

## 2024-02-16 DIAGNOSIS — I10 PRIMARY HYPERTENSION: ICD-10-CM

## 2024-02-16 LAB
APPEARANCE UR: ABNORMAL
BACTERIA #/AREA URNS AUTO: ABNORMAL /HPF
BILIRUB UR QL STRIP.AUTO: NEGATIVE
CHOLEST SERPL-MCNC: 213 MG/DL
CHOLEST/HDLC SERPL: 5 {RATIO} (ref 0–5)
COLOR UR AUTO: YELLOW
CREAT UR-MCNC: 335.5 MG/DL (ref 45–106)
EST. AVERAGE GLUCOSE BLD GHB EST-MCNC: 96.8 MG/DL
GLUCOSE UR QL STRIP.AUTO: NORMAL
HBA1C MFR BLD: 5 %
HDLC SERPL-MCNC: 39 MG/DL (ref 35–60)
HYALINE CASTS #/AREA URNS LPF: ABNORMAL /LPF
KETONES UR QL STRIP.AUTO: NEGATIVE
LDLC SERPL CALC-MCNC: 155 MG/DL (ref 50–140)
LEUKOCYTE ESTERASE UR QL STRIP.AUTO: 75
MICROALBUMIN UR-MCNC: 18.9 UG/ML
MICROALBUMIN/CREAT RATIO PNL UR: 5.6 MG/GM CR (ref 0–30)
MUCOUS THREADS URNS QL MICRO: ABNORMAL /LPF
NITRITE UR QL STRIP.AUTO: NEGATIVE
PH UR STRIP.AUTO: 6.5 [PH]
PROT UR QL STRIP.AUTO: ABNORMAL
RBC #/AREA URNS AUTO: ABNORMAL /HPF
RBC UR QL AUTO: ABNORMAL
SP GR UR STRIP.AUTO: 1.03 (ref 1–1.03)
SQUAMOUS #/AREA URNS LPF: ABNORMAL /HPF
T4 FREE SERPL-MCNC: 1.05 NG/DL (ref 0.7–1.48)
TRIGL SERPL-MCNC: 95 MG/DL (ref 37–140)
TSH SERPL-ACNC: 0.55 UIU/ML (ref 0.35–4.94)
UROBILINOGEN UR STRIP-ACNC: NORMAL
VLDLC SERPL CALC-MCNC: 19 MG/DL
WBC #/AREA URNS AUTO: ABNORMAL /HPF

## 2024-02-16 PROCEDURE — 3074F SYST BP LT 130 MM HG: CPT | Mod: CPTII,,, | Performed by: STUDENT IN AN ORGANIZED HEALTH CARE EDUCATION/TRAINING PROGRAM

## 2024-02-16 PROCEDURE — 84443 ASSAY THYROID STIM HORMONE: CPT | Performed by: STUDENT IN AN ORGANIZED HEALTH CARE EDUCATION/TRAINING PROGRAM

## 2024-02-16 PROCEDURE — 83036 HEMOGLOBIN GLYCOSYLATED A1C: CPT | Performed by: STUDENT IN AN ORGANIZED HEALTH CARE EDUCATION/TRAINING PROGRAM

## 2024-02-16 PROCEDURE — 80061 LIPID PANEL: CPT | Performed by: STUDENT IN AN ORGANIZED HEALTH CARE EDUCATION/TRAINING PROGRAM

## 2024-02-16 PROCEDURE — 82043 UR ALBUMIN QUANTITATIVE: CPT | Performed by: STUDENT IN AN ORGANIZED HEALTH CARE EDUCATION/TRAINING PROGRAM

## 2024-02-16 PROCEDURE — 1159F MED LIST DOCD IN RCRD: CPT | Mod: CPTII,,, | Performed by: STUDENT IN AN ORGANIZED HEALTH CARE EDUCATION/TRAINING PROGRAM

## 2024-02-16 PROCEDURE — 36415 COLL VENOUS BLD VENIPUNCTURE: CPT | Performed by: STUDENT IN AN ORGANIZED HEALTH CARE EDUCATION/TRAINING PROGRAM

## 2024-02-16 PROCEDURE — 84439 ASSAY OF FREE THYROXINE: CPT | Performed by: STUDENT IN AN ORGANIZED HEALTH CARE EDUCATION/TRAINING PROGRAM

## 2024-02-16 PROCEDURE — 3008F BODY MASS INDEX DOCD: CPT | Mod: CPTII,,, | Performed by: STUDENT IN AN ORGANIZED HEALTH CARE EDUCATION/TRAINING PROGRAM

## 2024-02-16 PROCEDURE — 99214 OFFICE O/P EST MOD 30 MIN: CPT | Mod: S$PBB,,, | Performed by: STUDENT IN AN ORGANIZED HEALTH CARE EDUCATION/TRAINING PROGRAM

## 2024-02-16 PROCEDURE — 99213 OFFICE O/P EST LOW 20 MIN: CPT | Mod: PBBFAC,PN | Performed by: STUDENT IN AN ORGANIZED HEALTH CARE EDUCATION/TRAINING PROGRAM

## 2024-02-16 PROCEDURE — 81001 URINALYSIS AUTO W/SCOPE: CPT | Performed by: STUDENT IN AN ORGANIZED HEALTH CARE EDUCATION/TRAINING PROGRAM

## 2024-02-16 PROCEDURE — 3078F DIAST BP <80 MM HG: CPT | Mod: CPTII,,, | Performed by: STUDENT IN AN ORGANIZED HEALTH CARE EDUCATION/TRAINING PROGRAM

## 2024-02-16 RX ORDER — LISINOPRIL AND HYDROCHLOROTHIAZIDE 20; 25 MG/1; MG/1
1 TABLET ORAL DAILY
Qty: 90 TABLET | Refills: 3 | Status: SHIPPED | OUTPATIENT
Start: 2024-02-16

## 2024-02-16 RX ORDER — SEMAGLUTIDE 1.34 MG/ML
1 INJECTION, SOLUTION SUBCUTANEOUS
Qty: 9 ML | Refills: 3 | Status: SHIPPED | OUTPATIENT
Start: 2024-02-16

## 2024-02-16 RX ORDER — ALPRAZOLAM 0.25 MG/1
0.25 TABLET ORAL 3 TIMES DAILY PRN
Qty: 30 TABLET | Refills: 0 | Status: SHIPPED | OUTPATIENT
Start: 2024-02-16 | End: 2024-03-25 | Stop reason: SDUPTHER

## 2024-02-16 RX ORDER — HYDROXYZINE PAMOATE 25 MG/1
25 CAPSULE ORAL EVERY 8 HOURS PRN
Qty: 90 CAPSULE | Refills: 6 | Status: SHIPPED | OUTPATIENT
Start: 2024-02-16

## 2024-02-16 NOTE — PROGRESS NOTES
Patient Name: Luh Chowdary     : 1990    MRN: [id number]     Subjective:     Patient ID: Luh Chowdary is a 34 y.o. female.    Chief Complaint:   Chief Complaint   Patient presents with    Annual Exam     Needs something to help focus. States that she is having some bad anxiety./73        HPI: HPI  34 year-old female presents for follow up of diabetes and issues with menstrual cycle    Interval history  Patient was given incorrect dose of blood pressure medicine at pharmacy and was having issues with presyncopal spells did go to outside hospital had CT scan negative troponin negative blood work  States that she feels much better now that her blood pressure is good    Has been referred to Dr. Ochoa   Reports that NuvaRing, Nexplanon, OCPs were tried but NuvaRing kept falling out Nexplanon did not controlled cycles and pills made patient nauseated         Obesity  Diabetes  Ozempic now at  1 mg. Reports that she is doing well on this medication     Constipation   Lactulose 15 ml daily- working     Anxiety depression possible PTSD/insomnia  Reports history of anxiety and depression acutely worsening in 2021 when her best friend was found dead unexpectedly  Also reports having 4 miscarriages which were very traumatizing for her  Her doctor had initially prescribed trazodone to help her sleep, Seroquel 25 mg to help her sleep but both made her too tired  To function the next morning also prescribed Wellbutrin  For anxiety and depression also prescribed Klonopin to take  When patient is having panic attacks   Lexapro 20 mg, Trazodone 50 mg  Vistaril 25 mg q8hrs. PRN   Asad added last visit but not working  Has established with Mrs. 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 See HPI for details.    History:     Past Medical History:   Diagnosis Date    Anxiety     Depression     Hypertension         Past Surgical History:   Procedure Laterality Date    SURGICAL REMOVAL OF BOTH KIDNEYS      TUBAL LIGATION         History reviewed. No pertinent family history.     Social History     Tobacco Use    Smoking status: Never     Passive exposure: Never    Smokeless tobacco: Never   Substance and Sexual Activity    Alcohol use: Not Currently    Drug use: Never    Sexual activity: Yes     Partners: Male       Current Outpatient Medications   Medication Instructions    ALPRAZolam (XANAX) 0.25 mg, Oral, 3 times daily PRN    EScitalopram oxalate (LEXAPRO) 20 mg, Oral, Daily, Take 1/2 tablet for first 7 days then may increase to whole tablet.    hydrOXYzine pamoate (VISTARIL) 25 mg, Oral, Every 8 hours PRN    ibuprofen (ADVIL,MOTRIN) 800 mg, Oral, 3 times daily    lactulose (CHRONULAC) 10 g, Oral, Daily    lisinopriL-hydrochlorothiazide (PRINZIDE,ZESTORETIC) 20-25 mg Tab 1 tablet, Oral, Daily    metoprolol succinate (TOPROL-XL) 25 mg, Oral, Daily    OZEMPIC 1 mg, Subcutaneous, Every 7 days    traZODone (DESYREL) 50 mg, Oral, Nightly        Review of patient's allergies indicates:  No Known Allergies    Objective:     Visit Vitals  /73 (BP Location: Left arm, Patient Position: Sitting)   Pulse 85   Temp 98.6 °F (37 °C) (Oral)   Ht 5' 7" (1.702 m)   Wt 98.4 kg (217 lb)   LMP 02/09/2024   SpO2 100%   BMI 33.99 kg/m²       Physical Examination:     Physical Exam  Constitutional:       General: She is not in acute distress.     Appearance: Normal appearance. She is not ill-appearing or diaphoretic.   HENT:      Nose: No congestion.   Eyes:      Conjunctiva/sclera: Conjunctivae normal.      Pupils: Pupils are equal, round, and reactive to light. "   Cardiovascular:      Rate and Rhythm: Normal rate and regular rhythm.      Heart sounds: No murmur heard.  Pulmonary:      Effort: Pulmonary effort is normal. No respiratory distress.      Breath sounds: Normal breath sounds. No wheezing.   Musculoskeletal:         General: No swelling, tenderness, deformity or signs of injury. Normal range of motion.      Cervical back: Normal range of motion.   Skin:     General: Skin is warm and dry.      Coloration: Skin is not jaundiced.   Neurological:      General: No focal deficit present.      Mental Status: She is alert and oriented to person, place, and time. Mental status is at baseline.      Gait: Gait normal.         Lab Results:     Chemistry:  Lab Results   Component Value Date     (L) 02/23/2023    K 4.1 02/23/2023    CHLORIDE 102 02/23/2023    BUN 12.1 02/23/2023    CREATININE 1.08 (H) 02/23/2023    EGFRNORACEVR >60 02/23/2023    GLUCOSE 91 02/23/2023    CALCIUM 9.6 02/23/2023    ALKPHOS 70 02/23/2023    LABPROT 7.6 02/23/2023    ALBUMIN 3.7 02/23/2023    BILIDIR 0.2 10/23/2020    IBILI 0.20 10/23/2020    AST 14 02/23/2023    ALT 15 02/23/2023    MG 1.9 07/29/2019    POCFZXMK49NW 12.7 (L) 06/24/2022    TSH 1.932 02/23/2023    GPVZWP5WDOB 0.90 02/23/2023        Lab Results   Component Value Date    HGBA1C 5.7 02/23/2023        Hematology:  Lab Results   Component Value Date    WBC 4.5 02/23/2023    HGB 11.2 (L) 02/23/2023    HCT 36.2 (L) 02/23/2023     02/23/2023       Lipid Panel:  Lab Results   Component Value Date    CHOL 215 (H) 02/23/2023    HDL 33 (L) 02/23/2023    .00 (H) 02/23/2023    TRIG 161 (H) 02/23/2023    TOTALCHOLEST 7 (H) 02/23/2023        Urine:  Lab Results   Component Value Date    COLORUA Light-Yellow 02/23/2023    APPEARANCEUA Clear 02/23/2023    SGUA 1.020 02/23/2023    PHUA 6.5 02/23/2023    PROTEINUA Negative 02/23/2023    GLUCOSEUA Normal 02/23/2023    KETONESUA Negative 02/23/2023    BLOODUA Negative 02/23/2023     NITRITESUA Negative 02/23/2023    LEUKOCYTESUR 250 (A) 02/23/2023    RBCUA 0-5 02/23/2023    WBCUA 6-10 (A) 02/23/2023    BACTERIA Trace (A) 02/23/2023    SQEPUA Many (A) 02/23/2023    HYALINECASTS None Seen 02/23/2023    CREATRANDUR 172.1 (H) 02/23/2023        Assessment:          ICD-10-CM ICD-9-CM   1. Anxiety and depression  F41.9 300.00    F32.A 311   2. Type 2 diabetes mellitus without complication, without long-term current use of insulin  E11.9 250.00   3. Essential (primary) hypertension  I10 401.9   4. Dysuria  R30.0 788.1   5. Solitary kidney, acquired  Z90.5 V45.73   6. Hypertension, unspecified type  I10 401.9   7. Primary insomnia  F51.01 307.42   8. Primary hypertension  I10 401.9        Plan:     1. Anxiety and depression  Assessment & Plan:  Continuing Lexapro 20 mg daily   Continue Vistaril p.r.n. anxiety   BuSpar did not work for patient adding Xanax 0.25 mg t.i.d. p.r.n. anxiety but instructed patient that this medication is habit-forming needs to use it sparingly as possible    Orders:  -     TSH; Future; Expected date: 02/16/2024  -     T4, Free; Future; Expected date: 02/16/2024  -     ALPRAZolam (XANAX) 0.25 MG tablet; Take 1 tablet (0.25 mg total) by mouth 3 (three) times daily as needed for Anxiety.  Dispense: 30 tablet; Refill: 0    2. Type 2 diabetes mellitus without complication, without long-term current use of insulin  Overview:  I    Assessment & Plan:  A1c  today Ozempic 1 mg Q 7 days    Orders:  -     Hemoglobin A1C; Future; Expected date: 02/16/2024  -     Microalbumin/Creatinine Ratio, Urine; Future; Expected date: 02/16/2024  -     Hemoglobin A1C    3. Essential (primary) hypertension  -     Lipid Panel; Future; Expected date: 02/16/2024    4. Dysuria  -     Urinalysis, Reflex to Urine Culture    5. Solitary kidney, acquired  Overview:    Continue Metoprolol 25 mg daily and continue Lisinopril- HCTZ 20/25    Orders:  -     Basic Metabolic Panel    6. Hypertension, unspecified  type  -     lisinopriL-hydrochlorothiazide (PRINZIDE,ZESTORETIC) 20-25 mg Tab; Take 1 tablet by mouth once daily.  Dispense: 90 tablet; Refill: 3    7. Primary insomnia  Overview:  Stable; Trazodone 50 mg added.     Assessment & Plan:  Stable continue trazodone      8. Primary hypertension  Assessment & Plan:  Stable   Continue lisinopril hydrochlorothiazide 20/25 and metoprolol XL 25 mg daily      Other orders  -     semaglutide (OZEMPIC) 1 mg/dose (4 mg/3 mL); Inject 1 mg into the skin every 7 days.  Dispense: 9 mL; Refill: 3  -     hydrOXYzine pamoate (VISTARIL) 25 MG Cap; Take 1 capsule (25 mg total) by mouth every 8 (eight) hours as needed (anxiety).  Dispense: 90 capsule; Refill: 6         Follow up in about 1 month (around 3/16/2024) for Virtual Visit, anxiety.    Future Appointments   Date Time Provider Department Center   6/10/2024  8:00 AM [facility], [facility] GYN [facility] G[facility] Curt Barrett MD

## 2024-02-16 NOTE — ASSESSMENT & PLAN NOTE
Continuing Lexapro 20 mg daily   Continue Vistaril p.r.n. anxiety   Marianna did not work for patient adding Xanax 0.25 mg t.i.d. p.r.n. anxiety but instructed patient that this medication is habit-forming needs to use it sparingly as possible

## 2024-03-18 ENCOUNTER — TELEPHONE (OUTPATIENT)
Dept: FAMILY MEDICINE | Facility: CLINIC | Age: 34
End: 2024-03-18
Payer: COMMERCIAL

## 2024-03-18 DIAGNOSIS — F41.9 ANXIETY AND DEPRESSION: ICD-10-CM

## 2024-03-18 DIAGNOSIS — F32.A ANXIETY AND DEPRESSION: ICD-10-CM

## 2024-03-19 RX ORDER — ALPRAZOLAM 0.25 MG/1
0.25 TABLET ORAL 3 TIMES DAILY PRN
Qty: 30 TABLET | Refills: 0 | OUTPATIENT
Start: 2024-03-19 | End: 2024-04-18

## 2024-03-25 NOTE — PROGRESS NOTES
Audio/visual Telehealth Visit     The patient location is:  [place]  The chief complaint leading to consultation is: anxiety  Visit type:  Synchronous audio visual  Total time spent with patient: 15min     Each patient to whom I provide medical services by telemedicine is:  (1) informed of the relationship between the physician and patient and the respective role of any other health care provider with respect to management of the patient; and (2) notified that they may decline to receive medical services by telemedicine and may withdraw from such care at any time. Patient verbally consented to receive this service via audio/visual call.      Subjective:   Patient ID: Luh Chowdary is a 34 y.o. female.    Chief Complaint: anxiety     HPI: 03/28/2024:  presents to virtual clinic today over concerns of anxiety medication.  States that her last office visit on February 16th she was started on Xanax 0.25 mg t.i.d. as needed for anxiety, states that she was told that if it did not work to take 2 of the pills.  Patient states that taking 0.5 mg allow her to not even have to take medication every day.  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  States that her anxiety has improved greatly but she is out of the Xanax.  Requesting refill of that medication today. Patient denies chest pain, palpitations, and shortness of breath.  Patient denies fever, night sweats, chills, nausea, vomiting, diarrhea, constipation, weight loss, and changes in appetite.            ROS:    12 point review of systems conducted, negative except as stated in the history of present illness. See HPI for details.      History:     Past Medical History:   Diagnosis Date    Anxiety     Depression     Hypertension       Past Surgical History:   Procedure Laterality Date    SURGICAL REMOVAL OF BOTH KIDNEYS      TUBAL LIGATION       History reviewed. No pertinent family history.   Social History      Tobacco Use    Smoking status: Never     Passive exposure: Never    Smokeless tobacco: Never   Substance and Sexual Activity    Alcohol use: Not Currently    Drug use: Never    Sexual activity: Yes     Partners: Male        Allergies: Review of patient's allergies indicates:  No Known Allergies  Objective:   There were no vitals filed for this visit.  There is no height or weight on file to calculate BMI.     Physical Examination:   Physical Exam  Constitutional:       General: She is not in acute distress.     Comments: Limited Physical Exam due to telemedicine visit   Pulmonary:      Effort: Pulmonary effort is normal. No respiratory distress.   Neurological:      Mental Status: She is alert.   Psychiatric:         Mood and Affect: Mood normal.         Behavior: Behavior normal.         Assessment:     Problem List Items Addressed This Visit       Anxiety and depression    Current Assessment & Plan     Continuing Lexapro 20 mg daily   Continue Vistaril p.r.n. anxiety   Previously instructed patient that this medication is habit-forming needs to use it sparingly as possible, patient still verbalizes understanding and only ran out of medication secondary to having to take double the dose.     Xanax 0.5 mg b.i.d. as needed for anxiety.  Encourage patient to only use this medication for panic attack.         Relevant Medications    ALPRAZolam (XANAX) 0.5 MG tablet       Plan:   Diagnoses and all orders for this visit:    Anxiety and depression  -     ALPRAZolam (XANAX) 0.5 MG tablet; Take 1 tablet (0.5 mg total) by mouth 2 (two) times daily as needed for Anxiety.       Follow up in about 20 days (around 4/17/2024), or if symptoms worsen or fail to improve, for Routine scheduled virtual follow-up with PCP.       This note was created with the assistance of a voice recognition software or phone dictation. There may be transcription errors as a result of using this technology however minimal. Effort has been made to  assure accuracy of transcription but any obvious errors or omissions should be clarified with the author of the document      This service was not originating from a related E/M service provided within the previous 7 days nor will  to an E/M service or procedure within the next 24 hours or my soonest available appointment.  Prevailing standard of care was able to be met in this audio-visual visit.

## 2024-03-28 ENCOUNTER — OFFICE VISIT (OUTPATIENT)
Dept: FAMILY MEDICINE | Facility: CLINIC | Age: 34
End: 2024-03-28
Payer: COMMERCIAL

## 2024-03-28 DIAGNOSIS — F41.9 ANXIETY AND DEPRESSION: ICD-10-CM

## 2024-03-28 DIAGNOSIS — F32.A ANXIETY AND DEPRESSION: ICD-10-CM

## 2024-03-28 PROCEDURE — 99214 OFFICE O/P EST MOD 30 MIN: CPT | Mod: 95,,,

## 2024-03-28 RX ORDER — ALPRAZOLAM 0.5 MG/1
0.5 TABLET ORAL 2 TIMES DAILY PRN
Qty: 30 TABLET | Refills: 0 | Status: SHIPPED | OUTPATIENT
Start: 2024-03-28 | End: 2024-04-22 | Stop reason: SDUPTHER

## 2024-03-28 NOTE — ASSESSMENT & PLAN NOTE
Continuing Lexapro 20 mg daily   Continue Vistaril p.r.n. anxiety   Previously instructed patient that this medication is habit-forming needs to use it sparingly as possible, patient still verbalizes understanding and only ran out of medication secondary to having to take double the dose.     Xanax 0.5 mg b.i.d. as needed for anxiety.  Encourage patient to only use this medication for panic attack.

## 2024-04-08 ENCOUNTER — TELEPHONE (OUTPATIENT)
Dept: FAMILY MEDICINE | Facility: CLINIC | Age: 34
End: 2024-04-08
Payer: COMMERCIAL

## 2024-04-08 NOTE — TELEPHONE ENCOUNTER
Patient states that she is having trouble getting into an OB appointment. She states that she was once on Transacemic acid suppositories a few years ago for the heavy bleeding. Wants to know if you can send this in for her for the bleeding. She states that she is having to use super pads and still bleeds through these. 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

## 2024-04-09 NOTE — TELEPHONE ENCOUNTER
I let patient know that back in October she was referred to Dr. Ochoa office and she needs to see them to follow up. Patient states that she believes that this might have been the office that told her they had a 6 month wait on appointments. Patient states that she is having to use 3 pads at a time and is bleeding through her clothes. I let patient know that she can try to follow up with the ER or urgent care due to the excess bleeding. Patient will follow up and will call back when Dr. LOERA is back.

## 2024-04-22 DIAGNOSIS — F32.A ANXIETY AND DEPRESSION: ICD-10-CM

## 2024-04-22 DIAGNOSIS — F41.9 ANXIETY AND DEPRESSION: ICD-10-CM

## 2024-04-22 RX ORDER — ALPRAZOLAM 0.5 MG/1
0.5 TABLET ORAL 2 TIMES DAILY PRN
Qty: 30 TABLET | Refills: 0 | Status: SHIPPED | OUTPATIENT
Start: 2024-04-22 | End: 2024-05-20 | Stop reason: SDUPTHER

## 2024-04-22 NOTE — TELEPHONE ENCOUNTER
Care Due:                  Date            Visit Type   Department     Provider  --------------------------------------------------------------------------------                                EP -                              PRIMARY      [facility]  Last Visit: 02-      CARE ([facility])   MEDICINE       Zara Barrett                              ESTABLISHED                              PATIENT -    [facility]  Next Visit: 05-      VIRTUAL      MEDICINE       Zara Barrett                                                            Last  Test          Frequency    Reason                     Performed    Due Date  --------------------------------------------------------------------------------    CBC.........  12 months..  ibuprofen................  02- 02-    CMP.........  12 months..  ibuprofen, lactulose,      02- 02-                             lisinopriL-hydrochlorothi                             azide....................    [employer] Embedded Care Due Messages. Reference number: [id number].   4/22/2024 1:41:43 PM CDT

## 2024-05-14 DIAGNOSIS — F32.A ANXIETY AND DEPRESSION: ICD-10-CM

## 2024-05-14 DIAGNOSIS — F41.9 ANXIETY AND DEPRESSION: ICD-10-CM

## 2024-05-14 RX ORDER — ALPRAZOLAM 0.5 MG/1
0.5 TABLET ORAL 2 TIMES DAILY PRN
Qty: 30 TABLET | Refills: 0 | OUTPATIENT
Start: 2024-05-14 | End: 2024-06-13

## 2024-05-14 NOTE — TELEPHONE ENCOUNTER
----- Message from Soraya Enriquez sent at 5/14/2024  2:47 PM CDT -----  Regarding: pt rq refill  ALPRAZolam (XANAX) 0.5 MG tablet\  Pt sent an auth to refill medicine and she called to check up on auth

## 2024-05-14 NOTE — TELEPHONE ENCOUNTER
No care due was identified.  [facility] Embedded Care Due Messages. Reference number: [id number].   5/14/2024 3:04:20 PM CDT

## 2024-05-20 ENCOUNTER — OFFICE VISIT (OUTPATIENT)
Dept: FAMILY MEDICINE | Facility: CLINIC | Age: 34
End: 2024-05-20
Payer: COMMERCIAL

## 2024-05-20 DIAGNOSIS — I10 PRIMARY HYPERTENSION: ICD-10-CM

## 2024-05-20 DIAGNOSIS — I10 HYPERTENSION, UNSPECIFIED TYPE: ICD-10-CM

## 2024-05-20 DIAGNOSIS — F51.01 PRIMARY INSOMNIA: Primary | ICD-10-CM

## 2024-05-20 DIAGNOSIS — F32.A ANXIETY AND DEPRESSION: ICD-10-CM

## 2024-05-20 DIAGNOSIS — F41.9 ANXIETY AND DEPRESSION: ICD-10-CM

## 2024-05-20 PROCEDURE — 99214 OFFICE O/P EST MOD 30 MIN: CPT | Mod: 95,,, | Performed by: STUDENT IN AN ORGANIZED HEALTH CARE EDUCATION/TRAINING PROGRAM

## 2024-05-20 RX ORDER — TRAZODONE HYDROCHLORIDE 100 MG/1
100 TABLET ORAL NIGHTLY
Qty: 30 TABLET | Refills: 11 | Status: SHIPPED | OUTPATIENT
Start: 2024-05-20 | End: 2025-05-20

## 2024-05-20 RX ORDER — ALPRAZOLAM 0.5 MG/1
0.5 TABLET ORAL 2 TIMES DAILY PRN
Qty: 30 TABLET | Refills: 0 | Status: SHIPPED | OUTPATIENT
Start: 2024-06-22 | End: 2024-05-27 | Stop reason: SDUPTHER

## 2024-05-20 RX ORDER — ALPRAZOLAM 0.5 MG/1
0.5 TABLET ORAL 2 TIMES DAILY PRN
Qty: 30 TABLET | Refills: 0 | Status: SHIPPED | OUTPATIENT
Start: 2024-07-22 | End: 2024-05-27 | Stop reason: SDUPTHER

## 2024-05-20 RX ORDER — ALPRAZOLAM 0.5 MG/1
0.5 TABLET ORAL 2 TIMES DAILY PRN
Qty: 30 TABLET | Refills: 0 | Status: SHIPPED | OUTPATIENT
Start: 2024-05-22 | End: 2024-05-27 | Stop reason: SDUPTHER

## 2024-05-20 RX ORDER — ESCITALOPRAM OXALATE 20 MG/1
TABLET ORAL
Qty: 90 TABLET | Refills: 3 | Status: SHIPPED | OUTPATIENT
Start: 2024-05-20

## 2024-05-20 RX ORDER — METOPROLOL SUCCINATE 25 MG/1
25 TABLET, EXTENDED RELEASE ORAL DAILY
Qty: 90 TABLET | Refills: 3 | Status: SHIPPED | OUTPATIENT
Start: 2024-05-20

## 2024-05-22 NOTE — PROGRESS NOTES
Audio Visual Telemedicine Visit     The patient location is: [place]  The chief complaint leading to consultation is: anxiety   Visit type: Virtual visit with audio visual  Total time spent with patient: 15 minutes      The reason for the audio only service rather than synchronous audio and video virtual visit was related to technical difficulties or patient preference/necessity.     Each patient to whom I provide medical services by telemedicine is:  (1) informed of the relationship between the physician and patient and the respective role of any other health care provider with respect to management of the patient; and (2) notified that they may decline to receive medical services by telemedicine and may withdraw from such care at any time. Patient verbally consented to receive this service via audio visual  Patient Name: Luh Chowdary   : 1990  MRN: [id number]     Subjective:   Patient ID: Luh Chowdary is a 34 y.o. female.    Chief Complaint: No chief complaint on file.       HPI: HPI  34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 for follow up of of anxiety         Diabetes  Ozempic now at  1 mg. Reports that she is doing well on this medication    Constipation   Lactulose 15 ml daily- working    Anxiety depression possible PTSD/insomnia  Reports history of anxiety and depression acutely worsening in 2021 when her best friend was found dead unexpectedly  Also reports having 4 miscarriages which were very traumatizing for her  Her doctor had initially prescribed trazodone to help her sleep, Seroquel 25 mg to help her sleep but both made her too tired  To function the next morning also prescribed Wellbutrin     Lexapro 20 mg, Trazodone 100 mg   Vistaril 25 mg q8hrs. PRN  Has established with Mrs. Cobos  Xanax 0.5 mg BID   Needs refills    Chronic Issues not discussed     Hypertension  Blood pressure 108/74 patient is currently taking lisinopril hydrochlorothiazide 20/25 per day   Metoprolol 25 mg daily       ROS:  Review of Systems   HENT:  Negative for hearing loss.    Eyes:  Negative for discharge.   Respiratory:  Negative for wheezing.    Cardiovascular:  Negative for chest pain and palpitations.   Gastrointestinal:  Negative for blood in stool, constipation, diarrhea and vomiting.   Genitourinary:  Negative for dysuria and hematuria.   Musculoskeletal:  Negative for neck pain.   Neurological:  Negative for weakness and headaches.   Endo/Heme/Allergies:  Negative for polydipsia.      History:     Past Medical History:   Diagnosis Date    Anxiety     Depression     Hypertension       Past Surgical History:   Procedure Laterality Date    SURGICAL REMOVAL OF BOTH KIDNEYS      TUBAL LIGATION       No family history on file.   Social History     Tobacco Use    Smoking status: Never     Passive exposure: Never    Smokeless tobacco: Never   Substance and Sexual Activity    Alcohol use: Not Currently    Drug use: Never    Sexual activity: Yes     Partners: Male        Allergies: Review of patient's allergies indicates:  No Known Allergies  Objective:   There were no vitals filed for this visit.  There is no height or weight on file to calculate BMI.     Physical Examination:   Physical Exam    Assessment:     Problem List Items Addressed This Visit          Psychiatric    Anxiety and depression    Current Assessment & Plan     Continuing Lexapro 20 mg daily   Continue Vistaril p.r.n. anxiety        Xanax 0.5 mg b.i.d. as needed for anxiety.  Encourage patient to only use this medication for panic attack.         Relevant Medications    ALPRAZolam (XANAX) 0.5 MG tablet    EScitalopram oxalate (LEXAPRO) 20 MG tablet    ALPRAZolam (XANAX) 0.5 MG tablet (Start on 6/22/2024)    ALPRAZolam (XANAX) 0.5 MG tablet (Start on 7/22/2024)       Cardiac/Vascular    Primary hypertension    Current Assessment & Plan      Continue lisinopril hydrochlorothiazide 20/25 and metoprolol XL 25 mg daily            Other    Primary insomnia -  Primary    Current Assessment & Plan     Stable continuing trazodone 100 mg nightly         Relevant Medications    traZODone (DESYREL) 100 MG tablet     Other Visit Diagnoses       Hypertension, unspecified type        Relevant Medications    metoprolol succinate (TOPROL-XL) 25 MG 24 hr tablet            Plan:   Diagnoses and all orders for this visit:    Primary insomnia  -     traZODone (DESYREL) 100 MG tablet; Take 1 tablet (100 mg total) by mouth every evening.    Anxiety and depression  -     ALPRAZolam (XANAX) 0.5 MG tablet; Take 1 tablet (0.5 mg total) by mouth 2 (two) times daily as needed for Anxiety.  -     EScitalopram oxalate (LEXAPRO) 20 MG tablet; One tablet daily  -     ALPRAZolam (XANAX) 0.5 MG tablet; Take 1 tablet (0.5 mg total) by mouth 2 (two) times daily as needed for Anxiety.  -     ALPRAZolam (XANAX) 0.5 MG tablet; Take 1 tablet (0.5 mg total) by mouth 2 (two) times daily as needed for Anxiety.    Hypertension, unspecified type  -     metoprolol succinate (TOPROL-XL) 25 MG 24 hr tablet; Take 1 tablet (25 mg total) by mouth once daily.    Primary hypertension       Follow up in about 3 months (around 8/20/2024) for Controlled substance and labs DM.       This note was created with the assistance of a voice recognition software or phone dictation. There may be transcription errors as a result of using this technology however minimal. Effort has been made to assure accuracy of transcription but any obvious errors or omissions should be clarified with the author of the document      This service was not originating from a related E/M service provided within the previous 7 days nor will  to an E/M service or procedure within the next 24 hours or my soonest available appointment.  Prevailing standard of care was able to be met in this audio visual visit

## 2024-05-22 NOTE — ASSESSMENT & PLAN NOTE
Continuing Lexapro 20 mg daily   Continue Vistaril p.r.n. anxiety        Xanax 0.5 mg b.i.d. as needed for anxiety.  Encourage patient to only use this medication for panic attack.

## 2024-05-27 ENCOUNTER — TELEPHONE (OUTPATIENT)
Dept: FAMILY MEDICINE | Facility: CLINIC | Age: 34
End: 2024-05-27
Payer: COMMERCIAL

## 2024-05-27 DIAGNOSIS — F32.A ANXIETY AND DEPRESSION: ICD-10-CM

## 2024-05-27 DIAGNOSIS — F41.9 ANXIETY AND DEPRESSION: ICD-10-CM

## 2024-05-27 RX ORDER — ALPRAZOLAM 0.5 MG/1
0.5 TABLET ORAL 2 TIMES DAILY PRN
Qty: 60 TABLET | Refills: 0 | Status: SHIPPED | OUTPATIENT
Start: 2024-06-22 | End: 2024-07-22

## 2024-05-27 RX ORDER — ALPRAZOLAM 0.5 MG/1
0.5 TABLET ORAL 2 TIMES DAILY PRN
Qty: 30 TABLET | Refills: 0 | Status: SHIPPED | OUTPATIENT
Start: 2024-05-27 | End: 2024-06-26

## 2024-05-27 RX ORDER — ALPRAZOLAM 0.5 MG/1
0.5 TABLET ORAL 2 TIMES DAILY PRN
Qty: 60 TABLET | Refills: 0 | Status: SHIPPED | OUTPATIENT
Start: 2024-07-22 | End: 2024-08-21

## 2024-05-27 NOTE — TELEPHONE ENCOUNTER
Yes I just had a visit with her I was not sure if she needed an actual increase in her medication however I see that she did have 30 prescribed it sounds like she needs 60 so I did recent medication for her

## 2024-05-27 NOTE — TELEPHONE ENCOUNTER
----- Message from Zara Barrett MD sent at 5/27/2024 12:16 PM CDT -----  Regarding: FW: medication amount concern  Seth Callejas,   This was directed to Rosalia Minor instead of me. I am not sure if patient still needs this addressed. Can you please ask her?  ----- Message -----  From: Rosalia Minor APRN  Sent: 5/24/2024  11:33 AM CDT  To: Zara Barrett MD  Subject: FW: medication amount concern                    Seth CRYSTAL  This message was sent to me but I have never seen her. I think that you might be the one prescribing the Xanax?   Thanks,  KMB  ----- Message -----  From: Jessica Aguilar MA  Sent: 5/24/2024  10:31 AM CDT  To: BEREKET Chaney  Subject: FW: medication amount concern                      ----- Message -----  From: Soraya Enriquez  Sent: 5/24/2024  10:06 AM CDT  To: Mily WILCOX Staff  Subject: medication amount concern                        Pt states her xanax is not lasting her the full month and she would like to explore options to make it last, she takes it 2x's a day and she wants to know if she needs to take it as needed or if she can continue the 2x a day but get more to last her till her next script, she currently has 30 count and is finishing it before the month is up due to taking it 2x a day

## 2024-06-10 ENCOUNTER — OFFICE VISIT (OUTPATIENT)
Dept: GYNECOLOGY | Facility: CLINIC | Age: 34
End: 2024-06-10
Payer: COMMERCIAL

## 2024-06-10 VITALS
BODY MASS INDEX: 33.96 KG/M2 | HEART RATE: 95 BPM | DIASTOLIC BLOOD PRESSURE: 68 MMHG | SYSTOLIC BLOOD PRESSURE: 112 MMHG | HEIGHT: 67 IN | TEMPERATURE: 98 F | WEIGHT: 216.38 LBS | OXYGEN SATURATION: 100 %

## 2024-06-10 DIAGNOSIS — N92.0 MENORRHAGIA WITH REGULAR CYCLE: ICD-10-CM

## 2024-06-10 PROCEDURE — 99215 OFFICE O/P EST HI 40 MIN: CPT | Mod: PBBFAC

## 2024-06-10 RX ORDER — MEDROXYPROGESTERONE ACETATE 150 MG/ML
150 INJECTION, SUSPENSION INTRAMUSCULAR
Status: ACTIVE | OUTPATIENT
Start: 2024-06-10

## 2024-06-10 RX ORDER — TRANEXAMIC ACID 650 MG/1
1300 TABLET ORAL 3 TIMES DAILY
Qty: 30 TABLET | Refills: 0 | Status: SHIPPED | OUTPATIENT
Start: 2024-06-10 | End: 2024-06-15

## 2024-06-10 NOTE — PROGRESS NOTES
[facility] GYNECOLOGY CLINIC NOTE     Luh Chowdary is a 34 y.o.  presenting to GYN clinic for evaluation of menorrhagia.     Reports several year history of menorrhagia, stating she first noticed a change in her cycles after her tubal ligation in 2018, however, patient with complex obstetric history (many second trimester losses) and admits the changes to her cycle may have occurred prior to 2018.     Regarding obstetric history:  G1: Full term  in   G2: Pre-term vaginal delivery at 26 wga 2/2 PPROM -  demise  G3: Pre-term vaginal delivery at approximately 21-22 wga of twin gestation -  demise of both babies  G4: Twin gestation with intrauterine demise of one twin at 28 wga,   delivery of surviving twin at 36 wga    Reports menarche at 10 with normal, regular monthly cycles. Continues to endorse regular monthly cycles, however, now with heavy menses requiring use of maxi pads that require hourly changing for first 3-4 days. Reports labor-like cramps the week leading up to menses. Was counseled by prior GYN to take NSAIDs to help with cramping pain. States she is not currently on any form of contraception but, that she has a sister who was prescribed TXA for her bleeding and admits to taking it and reports that it helps with the bleeding. Reports use of OCPs, an arm implant and Mirena IUD in the past. States her IUD was expelled within 6 weeks after placement.   Patient strongly desires to be amenorrheic.     Patient denies abnormal discharge, pelvic pain, abdominal pain, vulvar rash or skin changes, dysuria, dyspareunia. Sexually active with one male partner.     .gyn  OB History          4    Para   4    Term   1       3    AB   0    Living   2         SAB   0    IAB        Ectopic        Multiple   2    Live Births   3           Obstetric Comments   G1: Full term  in   G2: Pre-term vaginal delivery at 26 wga 2/2 PPROM -  demise  G3: Pre-term  "vaginal delivery at approximately 21-22 wga of twin gestation -  demise of both babies  G4: Twin gestation with intrauterine demise of on e twin at 28 wga,   delivery of surviving twin at 36 wga              Past Medical History:   Diagnosis Date    Anxiety     Depression     Hypertension       Past Surgical History:   Procedure Laterality Date    SURGICAL REMOVAL OF BOTH KIDNEYS      TUBAL LIGATION        Current Outpatient Medications   Medication Instructions    [START ON 2024] ALPRAZolam (XANAX) 0.5 mg, Oral, 2 times daily PRN    [START ON 2024] ALPRAZolam (XANAX) 0.5 mg, Oral, 2 times daily PRN    ALPRAZolam (XANAX) 0.5 mg, Oral, 2 times daily PRN    EScitalopram oxalate (LEXAPRO) 20 MG tablet One tablet daily    hydrOXYzine pamoate (VISTARIL) 25 mg, Oral, Every 8 hours PRN    ibuprofen (ADVIL,MOTRIN) 800 mg, Oral, 3 times daily    lactulose (CHRONULAC) 10 g, Oral, Daily    lisinopriL-hydrochlorothiazide (PRINZIDE,ZESTORETIC) 20-25 mg Tab 1 tablet, Oral, Daily    metoprolol succinate (TOPROL-XL) 25 mg, Oral, Daily    OZEMPIC 1 mg, Subcutaneous, Every 7 days    traZODone (DESYREL) 100 mg, Oral, Nightly     Social History     Tobacco Use    Smoking status: Never     Passive exposure: Never    Smokeless tobacco: Never   Substance Use Topics    Alcohol use: Not Currently    Drug use: Never       Review of Systems  Pertinent items noted in HPI.    Objective:     Vitals:    06/10/24 0801 06/10/24 0803   BP: 96/63 112/68   BP Location: Left arm Right arm   Pulse: 95    Temp: 98.4 °F (36.9 °C)    SpO2: 100%    Weight: 98.2 kg (216 lb 6.4 oz)    Height: 5' 7" (1.702 m)      Body mass index is 33.89 kg/m².    Physical Exam:   General: Alert and oriented, in no acute distress  Lungs: Breathing comfortably on room air, no conversational dyspnea  Heart: Regular rate, extremities well perfused  Abdomen: Soft, non-distended, non tender to palpation, no involuntary guarding, no rebound " tenderness  Extremities: Atraumatic, non-edematous, no cords or calf tenderness, no significant calf/ankle edema  External genitalia: Normal female genitalia without lesion, discharge or tenderness. Normal appearing urethral meatus. Normal appearing external anus.  Bimanual Exam: Uterus 8-10 cm in size, anteverted, freely mobile, smooth in contour, no masses. No cervical motion tenderness. No adnexal fullness/tenderness.  Speculum Exam: Vaginal mucosa pink and moist, without lesion. Scant, white discharge present in vaginal canal. Cervix well visualized, smooth in contour with no masses or lesions. Os normal in appearance without blood or discharge.   Note:  Female nurse chaperone present for entirety of exam.    Relevant Labs:   Lab Results   Component Value Date    WBC 4.5 02/23/2023    HGB 11.2 (L) 02/23/2023    HCT 36.2 (L) 02/23/2023    MCV 82.8 02/23/2023     02/23/2023         Relevant Imaging:  US Pelvis Complete Non OB (12/20/2021):   FINDINGS:         UTERUS: Uterus measures 10.8 x 5.0 x 7.0 cm with no worrisome, focal myometrial abnormalities appreciated.  Endometrial stripe is thickened measuring 1.6 cm in AP thickness.        OVARIES: The right ovary measures 4.4 x 1.7 x 3.9 cm and the left ovary measures 5.2 x 4.0 x 4.7 cm with a 3.1 cm, benign-appearing cyst with no worrisome perfusion on color flow mapping originating from the left ovary and a 2.4 cm, benign-appearing   cyst with no worrisome perfusion on color flow mapping originating from the right ovary.        MISCELLANEOUS:   No significant abnormalities noted.            IMPRESSION:        1.  The uterus is at least mildly enlarged with thickening of the endometrial stripe (1.6 cm in AP thickness).        2.  The previously noted, benign-appearing cysts appear significantly smaller on the right and slightly larger on the left when compared to the previous study of 9/20/2021.    US Pelvis Complete Non OB (9/20/2021):   FINDINGS:          UTERUS: The uterus measures 9.8 x 5.2 x 6.5 cm with the myometrium having a mildly heterogeneous echogenic appearance with no worrisome, focal masses appreciated.  Endometrial stripe measures 1.5 cm in AP thickness on transvaginal imaging.        OVARIES: The right ovary measures 6.2 x 4.0 x 5.7 cm and the left ovary measures 3.1 x 2.0 x 2.0 cm.  A 5.5 cm, benign-appearing, partially exophytic, simple cyst is noted originating from the right ovary and a 1.5 cm, less than optimally delineated,   benign-appearing cyst with no worrisome perfusion on color flow mapping is noted originating from the left ovary.        MISCELLANEOUS:   No significant abnormalities noted.            IMPRESSION:        1.  Bilateral ovarian cysts (5.5 cm on the right and 1.5 cm on the left) are present as described above.  See above comments for full details.  This cyst on the left is notably smaller than the previously described cyst (2020).        2.  Endometrial stripe measures 1.5 cm in AP thickness.      Assessment:     34 y.o.  here for evaluation of menorrhagia.  1. Menorrhagia with regular cycle  Ambulatory referral/consult to Gynecology    medroxyPROGESTERone (DEPO-PROVERA) injection 150 mg    US Pelvis Comp with Transvag NON-OB (xpd    tranexamic acid (LYSTEDA) 650 mg tablet    CBC Without Differential        Plan:         Problem List Items Addressed This Visit          Renal/    Menorrhagia with regular cycle     Patient with several year history of heavy menstrual bleeding refractory to OCPs, arm implant, and Mirena IUD.  TVUS from  with mildly enlarged uterus with focal myometrial abnormalities.  Last pap from  NILM, HPV neg.\  TSH/free T4 from 2024 wnl.    - CBC ordered to evaluate for anemia  - TVUS ordered to evaluated for any structural causes of AUB  - Patient counseled on options to induce amenorrhea including continuous progesterone only pills, Depo-Provera injections, and second trial of Mirena  IUD. Patient not a candidate for OCPs 2/2 HTN. Declining Mirena at this time. Patient initially interested in trial of Depo-Provera, however, wanting some time to decide definitively.   - TXA prescribed to help with next cycle           Relevant Medications    medroxyPROGESTERone (DEPO-PROVERA) injection 150 mg    tranexamic acid (LYSTEDA) 650 mg tablet    Other Relevant Orders    US Pelvis Comp with Transvag NON-OB (xpd    CBC Without Differential       Return to clinic 1 month via telehealth for discussion of proceeding with Depo-Provera     Future Appointments   Date Time Provider Department Center   7/1/2024 10:00 AM [address]   7/10/2024  2:15 PM [facility] GYN [facility]   8/22/2024  2:20 PM Zara Barrett MD [facility]        Discussed patient and plan with Dr. Mirza.     Nhung Jorge, MS3    Roberta Rogers MD  [facility] - PGY-2  1:19 PM 06/10/2024

## 2024-06-10 NOTE — ASSESSMENT & PLAN NOTE
Patient with several year history of heavy menstrual bleeding refractory to OCPs, arm implant, and Mirena IUD.  TVUS from 2021 with mildly enlarged uterus with focal myometrial abnormalities.  Last pap from 2021 NILM, HPV neg.\  TSH/free T4 from 2/2024 wnl.    - CBC ordered to evaluate for anemia  - TVUS ordered to evaluated for any structural causes of AUB  - Patient counseled on options to induce amenorrhea including continuous progesterone only pills, Depo-Provera injections, and second trial of Mirena IUD. Patient not a candidate for OCPs 2/2 HTN. Declining Mirena at this time. Patient initially interested in trial of Depo-Provera, however, wanting some time to decide definitively.   - TXA prescribed to help with next cycle

## 2024-08-22 ENCOUNTER — OFFICE VISIT (OUTPATIENT)
Dept: FAMILY MEDICINE | Facility: CLINIC | Age: 34
End: 2024-08-22
Payer: COMMERCIAL

## 2024-08-22 VITALS
HEIGHT: 67 IN | OXYGEN SATURATION: 100 % | WEIGHT: 213 LBS | SYSTOLIC BLOOD PRESSURE: 121 MMHG | HEART RATE: 83 BPM | DIASTOLIC BLOOD PRESSURE: 67 MMHG | BODY MASS INDEX: 33.43 KG/M2 | TEMPERATURE: 98 F

## 2024-08-22 DIAGNOSIS — F41.9 ANXIETY AND DEPRESSION: ICD-10-CM

## 2024-08-22 DIAGNOSIS — I10 PRIMARY HYPERTENSION: ICD-10-CM

## 2024-08-22 DIAGNOSIS — Z90.5 SOLITARY KIDNEY, ACQUIRED: ICD-10-CM

## 2024-08-22 DIAGNOSIS — F32.A ANXIETY AND DEPRESSION: ICD-10-CM

## 2024-08-22 DIAGNOSIS — R34 DECREASED URINE OUTPUT: Primary | ICD-10-CM

## 2024-08-22 LAB
BACTERIA #/AREA URNS AUTO: ABNORMAL /HPF
BILIRUB UR QL STRIP.AUTO: NEGATIVE
CLARITY UR: ABNORMAL
COLOR UR AUTO: ABNORMAL
GLUCOSE UR QL STRIP: NORMAL
HGB UR QL STRIP: ABNORMAL
HYALINE CASTS #/AREA URNS LPF: ABNORMAL /LPF
KETONES UR QL STRIP: NEGATIVE
LEUKOCYTE ESTERASE UR QL STRIP: NEGATIVE
MUCOUS THREADS URNS QL MICRO: ABNORMAL /LPF
NITRITE UR QL STRIP: NEGATIVE
PH UR STRIP: 5.5 [PH]
PROT UR QL STRIP: ABNORMAL
RBC #/AREA URNS AUTO: ABNORMAL /HPF
SP GR UR STRIP.AUTO: 1.03 (ref 1–1.03)
SQUAMOUS #/AREA URNS LPF: ABNORMAL /HPF
UNIDENT CRYS #/AREA URNS HPF: ABNORMAL /HPF
UROBILINOGEN UR STRIP-ACNC: NORMAL
WBC #/AREA URNS AUTO: ABNORMAL /HPF

## 2024-08-22 PROCEDURE — 80048 BASIC METABOLIC PNL TOTAL CA: CPT | Performed by: STUDENT IN AN ORGANIZED HEALTH CARE EDUCATION/TRAINING PROGRAM

## 2024-08-22 PROCEDURE — 81015 MICROSCOPIC EXAM OF URINE: CPT | Performed by: STUDENT IN AN ORGANIZED HEALTH CARE EDUCATION/TRAINING PROGRAM

## 2024-08-22 PROCEDURE — 36415 COLL VENOUS BLD VENIPUNCTURE: CPT | Performed by: STUDENT IN AN ORGANIZED HEALTH CARE EDUCATION/TRAINING PROGRAM

## 2024-08-22 PROCEDURE — 83735 ASSAY OF MAGNESIUM: CPT | Performed by: STUDENT IN AN ORGANIZED HEALTH CARE EDUCATION/TRAINING PROGRAM

## 2024-08-22 PROCEDURE — 87086 URINE CULTURE/COLONY COUNT: CPT | Performed by: STUDENT IN AN ORGANIZED HEALTH CARE EDUCATION/TRAINING PROGRAM

## 2024-08-22 PROCEDURE — 99213 OFFICE O/P EST LOW 20 MIN: CPT | Mod: PBBFAC,PN | Performed by: STUDENT IN AN ORGANIZED HEALTH CARE EDUCATION/TRAINING PROGRAM

## 2024-08-22 PROCEDURE — 81001 URINALYSIS AUTO W/SCOPE: CPT | Performed by: STUDENT IN AN ORGANIZED HEALTH CARE EDUCATION/TRAINING PROGRAM

## 2024-08-22 RX ORDER — ALPRAZOLAM 0.5 MG/1
0.5 TABLET ORAL DAILY PRN
Qty: 30 TABLET | Refills: 0 | Status: SHIPPED | OUTPATIENT
Start: 2024-08-22 | End: 2024-08-23

## 2024-08-22 NOTE — PROGRESS NOTES
Patient Name: Luh Chowdary     : 1990    MRN: [id number]     Subjective:     Patient ID: Luh Chowdary is a 34 y.o. female.    Chief Complaint:   Chief Complaint   Patient presents with    Follow-up     Patient here for follow up. Needs to discuss her ozempic she feels like it is causing her hair loss. She states that she does not feel like it is as effective.wants to report some lightheadedness the past few days. Bp 121/67        HPI: HPI  34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 for follow up of of anxiety c sinus infection and concern over her kidneys , dizziness         Diabetes  Ozempic now at  1 mg. Reports that she is doing well on this medication     Constipation   Lactulose 15 ml daily- working     Anxiety depression possible PTSD/insomnia  Reports history of anxiety and depression acutely worsening in 2021 when her best friend was found dead unexpectedly  Also reports having 4 miscarriages which were very traumatizing for her  Her doctor had initially prescribed trazodone to help her sleep, Seroquel 25 mg to help her sleep but both made her too tired  To function the next morning also prescribed Wellbutrin      Lexapro 20 mg, Trazodone 100 mg   Vistaril 25 mg q8hrs. PRN  Has established with Mrs. Cobos  Xanax 0.5 mg BID ; patient is requesting to decrease her Xanax 2 daily p.r.n.  Needs refills       Hypertension  Blood pressure 121/67  Patient has lost 16 lb with Ozempic states that she most notices the dizziness when she stands  Is currently taking lisinopril hydrochlorothiazide 20/25 and metoprolol succinate 25 mg daily h  Metoprolol 25 mg daily      Patient is reporting that she began with an upper respiratory infection approximately a week ago and now has purulent mucus that is green in color  ROS:      ROS     12 point review of systems conducted, negative except as stated in the history of present illness. See HPI for details.    History:     Past Medical History:   Diagnosis Date     "Anxiety     Depression     Hypertension         Past Surgical History:   Procedure Laterality Date    SURGICAL REMOVAL OF BOTH KIDNEYS      TUBAL LIGATION         Family History   Problem Relation Name Age of Onset    No Known Problems Father      Cancer Mother      No Known Problems Brother      No Known Problems Sister          Social History     Tobacco Use    Smoking status: Never     Passive exposure: Never    Smokeless tobacco: Never   Substance and Sexual Activity    Alcohol use: Not Currently    Drug use: Never    Sexual activity: Yes     Partners: Male       Current Outpatient Medications   Medication Instructions    ALPRAZolam (XANAX) 0.5 mg, Oral, Daily PRN    EScitalopram oxalate (LEXAPRO) 20 MG tablet One tablet daily    metoprolol succinate (TOPROL-XL) 25 mg, Oral, Daily    OZEMPIC 1 mg, Subcutaneous, Every 7 days    traZODone (DESYREL) 100 mg, Oral, Nightly        Review of patient's allergies indicates:  No Known Allergies    Objective:     Visit Vitals  /67 (BP Location: Right arm, Patient Position: Sitting)   Pulse 83   Temp 97.7 °F (36.5 °C) (Oral)   Ht 5' 7" (1.702 m)   Wt 96.6 kg (213 lb)   LMP 08/21/2024 (Exact Date)   SpO2 100%   BMI 33.36 kg/m²       Physical Examination:     Physical Exam  Constitutional:       General: She is not in acute distress.     Appearance: Normal appearance. She is not ill-appearing or diaphoretic.   HENT:      Nose: Congestion and rhinorrhea present.   Eyes:      Conjunctiva/sclera: Conjunctivae normal.      Pupils: Pupils are equal, round, and reactive to light.   Cardiovascular:      Rate and Rhythm: Normal rate and regular rhythm.      Heart sounds: No murmur heard.  Pulmonary:      Effort: Pulmonary effort is normal. No respiratory distress.      Breath sounds: Normal breath sounds. No wheezing.   Musculoskeletal:         General: No swelling, tenderness, deformity or signs of injury. Normal range of motion.      Cervical back: Normal range of motion. "   Skin:     General: Skin is warm and dry.      Coloration: Skin is not jaundiced.   Neurological:      General: No focal deficit present.      Mental Status: She is alert and oriented to person, place, and time. Mental status is at baseline.      Gait: Gait normal.         Lab Results:     Chemistry:  Lab Results   Component Value Date     08/22/2024    K 3.7 08/22/2024    BUN 11.9 08/22/2024    CREATININE 0.99 08/22/2024    EGFRNORACEVR >60 08/22/2024    GLUCOSE 70 (L) 08/22/2024    CALCIUM 9.6 08/22/2024    ALKPHOS 70 02/23/2023    LABPROT 7.6 02/23/2023    ALBUMIN 3.7 02/23/2023    BILIDIR 0.2 10/23/2020    IBILI 0.20 10/23/2020    AST 14 02/23/2023    ALT 15 02/23/2023    MG 2.00 08/22/2024    YCGQVWUV39JO 12.7 (L) 06/24/2022    TSH 0.553 02/16/2024    TLMICQ4PWPL 1.05 02/16/2024        Lab Results   Component Value Date    HGBA1C 5.0 02/16/2024        Hematology:  Lab Results   Component Value Date    WBC 4.5 02/23/2023    HGB 11.2 (L) 02/23/2023    HCT 36.2 (L) 02/23/2023     02/23/2023       Lipid Panel:  Lab Results   Component Value Date    CHOL 213 (H) 02/16/2024    HDL 39 02/16/2024    .00 (H) 02/16/2024    TRIG 95 02/16/2024    TOTALCHOLEST 5 02/16/2024        Urine:  Lab Results   Component Value Date    APPEARANCEUA Turbid (A) 08/22/2024    SGUA 1.029 08/22/2024    PROTEINUA Trace (A) 08/22/2024    KETONESUA Negative 08/22/2024    LEUKOCYTESUR Negative 08/22/2024    RBCUA 6-10 (A) 08/22/2024    WBCUA 11-20 (A) 08/22/2024    BACTERIA None Seen 08/22/2024    SQEPUA None Seen 08/22/2024    HYALINECASTS None Seen 08/22/2024    CREATRANDUR 335.5 (H) 02/16/2024        Assessment:          ICD-10-CM ICD-9-CM   1. Decreased urine output  R34 788.5   2. Anxiety and depression  F41.9 300.00    F32.A 311   3. Primary hypertension  I10 401.9   4. Solitary kidney, acquired  Z90.5 V45.73        Plan:     1. Decreased urine output  -     Urinalysis, Reflex to Urine Culture  -     Basic Metabolic  Panel; Future; Expected date: 08/22/2024  -     Magnesium; Future; Expected date: 08/22/2024  -     Urine culture    2. Anxiety and depression  Assessment & Plan:  Continue Lexapro 20 mg daily   Anxiety is improving decreasing Xanax to 0.5 mg daily p.r.n.    Orders:  -     ALPRAZolam (XANAX) 0.5 MG tablet; Take 1 tablet (0.5 mg total) by mouth daily as needed for Anxiety.  Dispense: 30 tablet; Refill: 0    3. Primary hypertension  Assessment & Plan:  Stopping lisinopril hydrochlorothiazide continuing metoprolol succinate XL 25 mg daily  Will monitor blood pressure  May add back lisinopril at low dose for kidney protection      4. Solitary kidney, acquired  Overview:        Assessment & Plan:  UA, BMP with Mag           Follow up in about 1 month (around 9/22/2024).    Future Appointments   Date Time Provider Department Center   10/15/2024  9:40 AM Zara Barrett MD [facility]        Zara Barrett MD

## 2024-08-23 ENCOUNTER — TELEPHONE (OUTPATIENT)
Dept: FAMILY MEDICINE | Facility: CLINIC | Age: 34
End: 2024-08-23
Payer: COMMERCIAL

## 2024-08-23 ENCOUNTER — PATIENT MESSAGE (OUTPATIENT)
Dept: FAMILY MEDICINE | Facility: CLINIC | Age: 34
End: 2024-08-23
Payer: COMMERCIAL

## 2024-08-23 DIAGNOSIS — F41.9 ANXIETY AND DEPRESSION: Primary | ICD-10-CM

## 2024-08-23 DIAGNOSIS — F32.A ANXIETY AND DEPRESSION: Primary | ICD-10-CM

## 2024-08-23 LAB
ANION GAP SERPL CALC-SCNC: 8 MEQ/L
BUN SERPL-MCNC: 11.9 MG/DL (ref 7–18.7)
CALCIUM SERPL-MCNC: 9.6 MG/DL (ref 8.4–10.2)
CHLORIDE SERPL-SCNC: 103 MMOL/L (ref 98–107)
CO2 SERPL-SCNC: 28 MMOL/L (ref 22–29)
CREAT SERPL-MCNC: 0.99 MG/DL (ref 0.55–1.02)
CREAT/UREA NIT SERPL: 12
GFR SERPLBLD CREATININE-BSD FMLA CKD-EPI: >60 ML/MIN/1.73/M2
GLUCOSE SERPL-MCNC: 70 MG/DL (ref 74–100)
MAGNESIUM SERPL-MCNC: 2 MG/DL (ref 1.6–2.6)
POTASSIUM SERPL-SCNC: 3.7 MMOL/L (ref 3.5–5.1)
SODIUM SERPL-SCNC: 139 MMOL/L (ref 136–145)

## 2024-08-23 RX ORDER — ALPRAZOLAM 1 MG/1
1 TABLET ORAL DAILY PRN
Qty: 30 TABLET | Refills: 0 | Status: SHIPPED | OUTPATIENT
Start: 2024-08-23

## 2024-08-23 NOTE — TELEPHONE ENCOUNTER
----- Message from Deb Orellana sent at 8/23/2024  9:51 AM CDT -----  Regarding: call back  .Who Called: Luh Chowdary    Caller is requesting assistance/information from provider's office.    Symptoms (please be specific):    How long has patient had these symptoms:    List of preferred pharmacies on file (remove unneeded): [unfilled]  If different, enter pharmacy into here including location and phone number:       Preferred Method of Contact: Phone Call  Patient's Preferred Phone Number on File: [phone]   Best Call Back Number, if different:  Additional Information: pt requesting a call back about med  ALPRAZolam (XANAX) 0.5 MG tablet

## 2024-08-23 NOTE — ASSESSMENT & PLAN NOTE
Stopping lisinopril hydrochlorothiazide continuing metoprolol succinate XL 25 mg daily  Will monitor blood pressure  May add back lisinopril at low dose for kidney protection

## 2024-08-23 NOTE — TELEPHONE ENCOUNTER
I looked in her note and it states decrease xanax from 0.5 twice daily to once daily. Just want to confirm. Patient believes that is incorrect.

## 2024-08-23 NOTE — TELEPHONE ENCOUNTER
Called patient.  confirmed. I let patient know I was returning her call about her xanax being changed to once daily 1mg. Patient states that she sees that the provider has corrected it. I let her know that I was just calling to make sure that she was notified.

## 2024-08-24 LAB — BACTERIA UR CULT: NORMAL

## 2024-08-30 ENCOUNTER — PATIENT MESSAGE (OUTPATIENT)
Dept: FAMILY MEDICINE | Facility: CLINIC | Age: 34
End: 2024-08-30
Payer: COMMERCIAL

## 2024-09-04 ENCOUNTER — TELEPHONE (OUTPATIENT)
Dept: FAMILY MEDICINE | Facility: CLINIC | Age: 34
End: 2024-09-04
Payer: COMMERCIAL

## 2024-09-04 NOTE — TELEPHONE ENCOUNTER
----- Message from Marylou Shah sent at 9/4/2024  3:31 PM CDT -----  Regarding: Patient Called  Patient called stating that she was told to call Dr. Barrett to let her know how she was doing off of her Lisinopril & Fluid pill.  Stated she had swelling in her legs and has a 10 lb weight gain in 1 week - went from 213 to 223.  Also her blood pressure was 122/50+ and the nurse at school just took it and it was 133/86.  She is still having headaches and would like for you or Dr. Barrett to call her back at [phone].    Thanks,  Marylou

## 2024-09-05 DIAGNOSIS — I10 PRIMARY HYPERTENSION: Primary | ICD-10-CM

## 2024-09-05 RX ORDER — LISINOPRIL AND HYDROCHLOROTHIAZIDE 10; 12.5 MG/1; MG/1
1 TABLET ORAL DAILY
Qty: 90 TABLET | Refills: 3 | Status: SHIPPED | OUTPATIENT
Start: 2024-09-05 | End: 2025-09-05

## 2024-09-05 NOTE — TELEPHONE ENCOUNTER
Called patient to make sure she seen the message I sent her through her portal. Confirmed she did. She asked what It was changed to and I let her know. She will try It. I gave patient ER precautions again in case of elevated blood pressure, chest pains, or shortness of breath, or if the swelling does not go down. Patient expressed understanding. States that this school year has just been a lot. I let her know to give me a call if I can help her with anything else.

## 2024-09-05 NOTE — TELEPHONE ENCOUNTER
The weight gain of 10 pounds of fluid is concerning as patient was on a small dose of medication. We can restart her medication at a lower dose for her. If the headache and weight gain doesn't go away she needs to get an eval here or in the ER especially if she has SOB or CP

## 2024-09-13 ENCOUNTER — PATIENT MESSAGE (OUTPATIENT)
Dept: FAMILY MEDICINE | Facility: CLINIC | Age: 34
End: 2024-09-13
Payer: COMMERCIAL

## 2024-09-13 DIAGNOSIS — F41.9 ANXIETY AND DEPRESSION: ICD-10-CM

## 2024-09-13 DIAGNOSIS — F32.A ANXIETY AND DEPRESSION: ICD-10-CM

## 2024-09-13 RX ORDER — ALPRAZOLAM 1 MG/1
1 TABLET ORAL DAILY PRN
Qty: 30 TABLET | Refills: 0 | Status: SHIPPED | OUTPATIENT
Start: 2024-09-22

## 2024-09-13 RX ORDER — ALPRAZOLAM 1 MG/1
1 TABLET ORAL DAILY PRN
Qty: 30 TABLET | Refills: 0 | Status: SHIPPED | OUTPATIENT
Start: 2024-10-21

## 2024-10-21 ENCOUNTER — TELEPHONE (OUTPATIENT)
Dept: FAMILY MEDICINE | Facility: CLINIC | Age: 34
End: 2024-10-21
Payer: COMMERCIAL

## 2024-10-21 NOTE — TELEPHONE ENCOUNTER
----- Message from Solomon Sadaf sent at 10/21/2024  3:13 PM CDT -----  Regarding: advice  Who Called: Luh Chowdary    Caller is requesting assistance/information from provider's office.    Symptoms (please be specific):    How long has patient had these symptoms:    List of preferred pharmacies on file (remove unneeded): [unfilled]  If different, enter pharmacy into here including location and phone number:       Preferred Method of Contact: Phone Call  Patient's Preferred Phone Number on File: [phone]   Best Call Back Number, if different:    Additional Information: Pt called and stated she had a virtual visit at 1:40, she stated she she logged on and stayed on the appointment for as long as she could but provider never got on visit. Appt notes show pt did not log in, but pt stated she logged in a few minutes early. Please contact pt to reschedule virtual visit.

## 2024-10-30 ENCOUNTER — OFFICE VISIT (OUTPATIENT)
Dept: FAMILY MEDICINE | Facility: CLINIC | Age: 34
End: 2024-10-30
Payer: COMMERCIAL

## 2024-10-30 DIAGNOSIS — R10.2 PELVIC PAIN: Primary | ICD-10-CM

## 2024-10-30 DIAGNOSIS — F41.9 ANXIETY AND DEPRESSION: ICD-10-CM

## 2024-10-30 DIAGNOSIS — E11.9 TYPE 2 DIABETES MELLITUS WITHOUT COMPLICATION, WITHOUT LONG-TERM CURRENT USE OF INSULIN: ICD-10-CM

## 2024-10-30 DIAGNOSIS — F32.A ANXIETY AND DEPRESSION: ICD-10-CM

## 2024-10-30 DIAGNOSIS — N94.10 DYSPAREUNIA IN FEMALE: ICD-10-CM

## 2024-10-30 RX ORDER — SEMAGLUTIDE 2.68 MG/ML
2 INJECTION, SOLUTION SUBCUTANEOUS
Qty: 3 ML | Refills: 11 | Status: SHIPPED | OUTPATIENT
Start: 2024-10-30 | End: 2025-10-30

## 2024-10-30 RX ORDER — ALPRAZOLAM 1 MG/1
1 TABLET ORAL DAILY PRN
Qty: 30 TABLET | Refills: 0 | Status: SHIPPED | OUTPATIENT
Start: 2024-12-24

## 2024-10-30 RX ORDER — ALPRAZOLAM 1 MG/1
1 TABLET ORAL DAILY PRN
Qty: 30 TABLET | Refills: 0 | Status: SHIPPED | OUTPATIENT
Start: 2025-01-24

## 2024-10-30 RX ORDER — ALPRAZOLAM 1 MG/1
1 TABLET ORAL DAILY PRN
Qty: 30 TABLET | Refills: 0 | Status: SHIPPED | OUTPATIENT
Start: 2024-11-24

## 2024-10-31 PROBLEM — N94.10 DYSPAREUNIA IN FEMALE: Status: ACTIVE | Noted: 2024-10-31

## 2024-12-08 ENCOUNTER — PATIENT MESSAGE (OUTPATIENT)
Dept: FAMILY MEDICINE | Facility: CLINIC | Age: 34
End: 2024-12-08
Payer: COMMERCIAL

## 2024-12-13 ENCOUNTER — HOSPITAL ENCOUNTER (EMERGENCY)
Facility: HOSPITAL | Age: 34
Discharge: HOME | End: 2024-12-13
Attending: INTERNAL MEDICINE
Payer: COMMERCIAL

## 2024-12-13 VITALS
DIASTOLIC BLOOD PRESSURE: 82 MMHG | BODY MASS INDEX: 34.03 KG/M2 | OXYGEN SATURATION: 100 % | WEIGHT: 216.81 LBS | TEMPERATURE: 98 F | HEART RATE: 90 BPM | HEIGHT: 67 IN | RESPIRATION RATE: 18 BRPM | SYSTOLIC BLOOD PRESSURE: 132 MMHG

## 2024-12-13 DIAGNOSIS — J06.9 UPPER RESPIRATORY TRACT INFECTION, UNSPECIFIED TYPE: Primary | ICD-10-CM

## 2024-12-13 LAB
FLUAV AG UPPER RESP QL IA.RAPID: NOT DETECTED
FLUBV AG UPPER RESP QL IA.RAPID: NOT DETECTED
RSV A 5' UTR RNA NPH QL NAA+PROBE: NOT DETECTED
SARS-COV-2 RNA RESP QL NAA+PROBE: NOT DETECTED

## 2024-12-13 PROCEDURE — 0241U COVID/RSV/FLU A&B PCR: CPT | Performed by: NURSE PRACTITIONER

## 2024-12-13 PROCEDURE — 99283 EMERGENCY DEPT VISIT LOW MDM: CPT

## 2024-12-13 RX ORDER — BENZONATATE 100 MG/1
100 CAPSULE ORAL 3 TIMES DAILY PRN
Qty: 20 CAPSULE | Refills: 0 | Status: SHIPPED | OUTPATIENT
Start: 2024-12-13 | End: 2024-12-23

## 2024-12-13 RX ORDER — FLUTICASONE PROPIONATE 50 MCG
2 SPRAY, SUSPENSION (ML) NASAL 2 TIMES DAILY PRN
Qty: 9.9 ML | Refills: 0 | Status: SHIPPED | OUTPATIENT
Start: 2024-12-13 | End: 2024-12-23

## 2024-12-14 NOTE — ED PROVIDER NOTES
Encounter Date: 12/13/2024       History     Chief Complaint   Patient presents with    Cough     C/o cough and congestion x 1 week, worsening at night.      See MDM    The history is provided by the patient. No  was used.     Review of patient's allergies indicates:  No Known Allergies  Past Medical History:   Diagnosis Date    Anxiety     Depression     Hypertension      Past Surgical History:   Procedure Laterality Date    SURGICAL REMOVAL OF BOTH KIDNEYS      TUBAL LIGATION       Family History   Problem Relation Name Age of Onset    No Known Problems Father      Cancer Mother      No Known Problems Brother      No Known Problems Sister       Social History     Tobacco Use    Smoking status: Never     Passive exposure: Never    Smokeless tobacco: Never   Substance Use Topics    Alcohol use: Not Currently    Drug use: Never     Review of Systems   Constitutional:  Negative for fever.   Respiratory:  Positive for cough. Negative for shortness of breath.    Cardiovascular:  Negative for chest pain.   Gastrointestinal:  Negative for abdominal pain.   Genitourinary:  Negative for difficulty urinating and dysuria.   Musculoskeletal:  Positive for myalgias. Negative for gait problem.   Skin:  Negative for color change.   Neurological:  Negative for dizziness, speech difficulty and headaches.   Psychiatric/Behavioral:  Negative for hallucinations and suicidal ideas.    All other systems reviewed and are negative.      Physical Exam     Initial Vitals [12/13/24 1919]   BP Pulse Resp Temp SpO2   130/82 96 20 98.1 °F (36.7 °C) 100 %      MAP       --         Physical Exam    Nursing note and vitals reviewed.  Constitutional: She appears well-developed and well-nourished.   HENT:   Head: Normocephalic.   Eyes: EOM are normal.   Neck:   Normal range of motion.  Cardiovascular:  Normal rate, regular rhythm, normal heart sounds and intact distal pulses.           Pulmonary/Chest: Breath sounds normal. No  respiratory distress.   Abdominal: Abdomen is soft. Bowel sounds are normal. There is no abdominal tenderness.   Musculoskeletal:         General: Normal range of motion.      Cervical back: Normal range of motion.     Neurological: She is alert and oriented to person, place, and time. She has normal strength.   Skin: Skin is warm and dry.   Psychiatric: She has a normal mood and affect. Her behavior is normal. Judgment and thought content normal.         ED Course   Procedures  Labs Reviewed   COVID/RSV/FLU A&B PCR - Normal       Result Value    Influenza A PCR Not Detected      Influenza B PCR Not Detected      Respiratory Syncytial Virus PCR Not Detected      SARS-CoV-2 PCR Not Detected      Narrative:     The Xpert Xpress SARS-CoV-2/FLU/RSV plus is a rapid, multiplexed real-time PCR test intended for the simultaneous qualitative detection and differentiation of SARS-CoV-2, Influenza A, Influenza B, and respiratory syncytial virus (RSV) viral RNA in either nasopharyngeal swab or nasal swab specimens.                Imaging Results    None          Medications - No data to display  Medical Decision Making  Historian:  Patient.  Patient is a Black or  34 y.o. female that presents with cough and body aches that has been present few days. Associated symptoms nothing. Surrounding information is ill contacts at work. Exacerbated by nothing. Relieved by nothing. Patient treatment prior to arrival none. Risk factors include none. Other history pertaining to this complaint nothing.   Assessment:  See physical exam.  DD:  COVID, flu, RSV, upper respiratory infection  ED Course: History was obtained.  Physical was performed.  Patient appears to have upper respiratory infection. Medical or surgical consults:  None. Social determinants that affect healthcare:  None.       Amount and/or Complexity of Data Reviewed  Labs:      Details: Negative COVID, flu, RSV    Risk  Prescription drug management.  Risk  Details: Flonase and tessalon                                       Clinical Impression:  Final diagnoses:  [J06.9] Upper respiratory tract infection, unspecified type (Primary)          ED Disposition Condition    Discharge Stable          ED Prescriptions       Medication Sig Dispense Start Date End Date Auth. Provider    fluticasone propionate (FLONASE) 50 mcg/actuation nasal spray 2 sprays (100 mcg total) by Each Nostril route 2 (two) times daily as needed for Rhinitis. 9.9 mL 12/13/2024 12/23/2024 Anatoliy Hunt FNP    benzonatate (TESSALON) 100 MG capsule Take 1 capsule (100 mg total) by mouth 3 (three) times daily as needed for Cough. 20 capsule 12/13/2024 12/23/2024 Anatoliy Hunt FNP          Follow-up Information       Follow up With Specialties Details Why Contact Info    Your Primary Care Provider  Call in 3 days ed follow up              Anatoliy Hunt FNP  12/13/24 2048

## 2025-01-12 ENCOUNTER — PATIENT MESSAGE (OUTPATIENT)
Dept: FAMILY MEDICINE | Facility: CLINIC | Age: 35
End: 2025-01-12
Payer: COMMERCIAL

## 2025-02-13 DIAGNOSIS — F32.A ANXIETY AND DEPRESSION: ICD-10-CM

## 2025-02-13 DIAGNOSIS — F41.9 ANXIETY AND DEPRESSION: ICD-10-CM

## 2025-02-14 RX ORDER — ALPRAZOLAM 1 MG/1
1 TABLET ORAL DAILY PRN
Qty: 30 TABLET | Refills: 1 | Status: SHIPPED | OUTPATIENT
Start: 2025-02-14

## 2025-02-14 NOTE — TELEPHONE ENCOUNTER
Care Due:                  Date            Visit Type   Department     Provider  --------------------------------------------------------------------------------                                ESTABLISHED                              PATIENT -    [facility]  Last Visit: 10-      [facility]      MEDICINE       Zara Barrett  Next Visit: None Scheduled  None         None Found                                                            Last  Test          Frequency    Reason                     Performed    Due Date  --------------------------------------------------------------------------------    HBA1C.......  6 months...  semaglutide..............  02- 08-    [facility] Embedded Care Due Messages. Reference number: [id number].   2/13/2025 6:36:52 PM CST

## 2025-02-14 NOTE — TELEPHONE ENCOUNTER
Please inform the patient that I have sent in a 1 month supply of medicine with 1 refill (2 months). I have also send a referral to [facility] for further mgt as we will not be able to continue to prescribe this medication for her. There are soon appts. Please send referral and give patient phone number to call to set up appt.     Thanks,    La Peter,LILLIANP

## 2025-03-08 ENCOUNTER — PATIENT MESSAGE (OUTPATIENT)
Dept: FAMILY MEDICINE | Facility: CLINIC | Age: 35
End: 2025-03-08
Payer: COMMERCIAL

## 2025-03-10 NOTE — TELEPHONE ENCOUNTER
Returned the patients phone call.  confirmed. I informed the patient that I was calling to give her the phone number to the [facility] pscyciatrist. Phone number given. Patient then asked if we had her down for an appointment for a follow up. I informed he rthat we do not have her schedueld at this time. She asked if we only have NP's here still. I let her know that yes at this time we just have NP's. Patient states that she will try to establish care elsewhere with a medical doctor. I informed her that I understood. She states that if she has problems finding someone she will give us a call back. Understood.

## 2025-03-19 ENCOUNTER — TELEPHONE (OUTPATIENT)
Dept: FAMILY MEDICINE | Facility: CLINIC | Age: 35
End: 2025-03-19
Payer: COMMERCIAL

## 2025-03-19 DIAGNOSIS — F32.A ANXIETY AND DEPRESSION: Primary | ICD-10-CM

## 2025-03-19 DIAGNOSIS — F41.9 ANXIETY AND DEPRESSION: Primary | ICD-10-CM

## 2025-03-19 NOTE — TELEPHONE ENCOUNTER
Patient called stating that she needs a new psych referral sent to someone else because it is closer to her job. States that she spoke with them and they are able to see her,     Stefany Crain   Located in [place]     Phone number: [phone]  Fax: [phone]

## 2025-03-19 NOTE — TELEPHONE ENCOUNTER
----- Message from Deb sent at 3/18/2025  3:08 PM CDT -----  Regarding: referral  .Who Called: Luh Jarrell is requesting assistance/information from provider's office.Symptoms (please be specific):  How long has patient had these symptoms:  List of preferred pharmacies on file (remove unneeded): [unfilled]If different, enter pharmacy into here including location and phone number: Preferred Method of Contact: Phone CallPatient's Preferred Phone Number on File: [phone] Best Call Back Number, if different:Additional Information: pt requesting a call back about a referral

## 2025-04-03 ENCOUNTER — LAB VISIT (OUTPATIENT)
Dept: LAB | Facility: HOSPITAL | Age: 35
End: 2025-04-03
Attending: FAMILY MEDICINE
Payer: COMMERCIAL

## 2025-04-03 DIAGNOSIS — E78.00 PURE HYPERCHOLESTEROLEMIA: Primary | ICD-10-CM

## 2025-04-03 DIAGNOSIS — E11.9 DIABETES MELLITUS WITHOUT COMPLICATION: ICD-10-CM

## 2025-04-03 LAB
25(OH)D3+25(OH)D2 SERPL-MCNC: 18 NG/ML (ref 30–80)
ALBUMIN SERPL-MCNC: 3.4 G/DL (ref 3.5–5)
ALBUMIN/GLOB SERPL: 0.7 RATIO (ref 1.1–2)
ALP SERPL-CCNC: 81 UNIT/L (ref 40–150)
ALT SERPL-CCNC: 11 UNIT/L (ref 0–55)
ANION GAP SERPL CALC-SCNC: 6 MEQ/L
AST SERPL-CCNC: 12 UNIT/L (ref 11–45)
BACTERIA #/AREA URNS AUTO: ABNORMAL /HPF
BASOPHILS # BLD AUTO: 0.02 X10(3)/MCL
BASOPHILS NFR BLD AUTO: 0.4 %
BILIRUB SERPL-MCNC: 0.4 MG/DL
BILIRUB UR QL STRIP.AUTO: NEGATIVE
BUN SERPL-MCNC: 12 MG/DL (ref 7–18.7)
CALCIUM SERPL-MCNC: 8.9 MG/DL (ref 8.4–10.2)
CHLORIDE SERPL-SCNC: 107 MMOL/L (ref 98–107)
CHOLEST SERPL-MCNC: 172 MG/DL
CHOLEST/HDLC SERPL: 5 {RATIO} (ref 0–5)
CLARITY UR: ABNORMAL
CO2 SERPL-SCNC: 26 MMOL/L (ref 22–29)
COLOR UR AUTO: YELLOW
CREAT SERPL-MCNC: 0.89 MG/DL (ref 0.55–1.02)
CREAT/UREA NIT SERPL: 13
EOSINOPHIL # BLD AUTO: 0.1 X10(3)/MCL (ref 0–0.9)
EOSINOPHIL NFR BLD AUTO: 2 %
ERYTHROCYTE [DISTWIDTH] IN BLOOD BY AUTOMATED COUNT: 17 % (ref 11.5–17)
EST. AVERAGE GLUCOSE BLD GHB EST-MCNC: 105.4 MG/DL
GFR SERPLBLD CREATININE-BSD FMLA CKD-EPI: >60 ML/MIN/1.73/M2
GLOBULIN SER-MCNC: 4.6 GM/DL (ref 2.4–3.5)
GLUCOSE SERPL-MCNC: 116 MG/DL (ref 74–100)
GLUCOSE UR QL STRIP: NEGATIVE
HBA1C MFR BLD: 5.3 %
HCT VFR BLD AUTO: 30.1 % (ref 37–47)
HDLC SERPL-MCNC: 34 MG/DL (ref 35–60)
HGB BLD-MCNC: 8.5 G/DL (ref 12–16)
HGB UR QL STRIP: NEGATIVE
IMM GRANULOCYTES # BLD AUTO: 0.02 X10(3)/MCL (ref 0–0.04)
IMM GRANULOCYTES NFR BLD AUTO: 0.4 %
KETONES UR QL STRIP: NEGATIVE
LDLC SERPL CALC-MCNC: 125 MG/DL (ref 50–140)
LEUKOCYTE ESTERASE UR QL STRIP: NEGATIVE
LYMPHOCYTES # BLD AUTO: 1.18 X10(3)/MCL (ref 0.6–4.6)
LYMPHOCYTES NFR BLD AUTO: 23.2 %
MCH RBC QN AUTO: 19.7 PG (ref 27–31)
MCHC RBC AUTO-ENTMCNC: 28.2 G/DL (ref 33–36)
MCV RBC AUTO: 69.8 FL (ref 80–94)
MONOCYTES # BLD AUTO: 0.57 X10(3)/MCL (ref 0.1–1.3)
MONOCYTES NFR BLD AUTO: 11.2 %
NEUTROPHILS # BLD AUTO: 3.19 X10(3)/MCL (ref 2.1–9.2)
NEUTROPHILS NFR BLD AUTO: 62.8 %
NITRITE UR QL STRIP: NEGATIVE
NRBC BLD AUTO-RTO: 0 %
PH UR STRIP: 5.5 [PH]
PLATELET # BLD AUTO: 371 X10(3)/MCL (ref 130–400)
PMV BLD AUTO: 9.5 FL (ref 7.4–10.4)
POTASSIUM SERPL-SCNC: 4.2 MMOL/L (ref 3.5–5.1)
PROT SERPL-MCNC: 8 GM/DL (ref 6.4–8.3)
PROT UR QL STRIP: ABNORMAL
RBC # BLD AUTO: 4.31 X10(6)/MCL (ref 4.2–5.4)
RBC #/AREA URNS AUTO: ABNORMAL /HPF
SODIUM SERPL-SCNC: 139 MMOL/L (ref 136–145)
SP GR UR STRIP.AUTO: 1.02 (ref 1–1.03)
SQUAMOUS #/AREA URNS AUTO: ABNORMAL /HPF
T4 FREE SERPL-MCNC: 1.12 NG/DL (ref 0.7–1.48)
TRIGL SERPL-MCNC: 65 MG/DL (ref 37–140)
TSH SERPL-ACNC: 0.64 UIU/ML (ref 0.35–4.94)
UROBILINOGEN UR STRIP-ACNC: 0.2
VLDLC SERPL CALC-MCNC: 13 MG/DL
WBC # BLD AUTO: 5.08 X10(3)/MCL (ref 4.5–11.5)
WBC #/AREA URNS AUTO: ABNORMAL /HPF

## 2025-04-03 PROCEDURE — 80061 LIPID PANEL: CPT

## 2025-04-03 PROCEDURE — 85025 COMPLETE CBC W/AUTO DIFF WBC: CPT

## 2025-04-03 PROCEDURE — 83036 HEMOGLOBIN GLYCOSYLATED A1C: CPT

## 2025-04-03 PROCEDURE — 84439 ASSAY OF FREE THYROXINE: CPT

## 2025-04-03 PROCEDURE — 82306 VITAMIN D 25 HYDROXY: CPT

## 2025-04-03 PROCEDURE — 84443 ASSAY THYROID STIM HORMONE: CPT

## 2025-04-03 PROCEDURE — 80053 COMPREHEN METABOLIC PANEL: CPT

## 2025-04-03 PROCEDURE — 36415 COLL VENOUS BLD VENIPUNCTURE: CPT

## 2025-04-03 PROCEDURE — 81003 URINALYSIS AUTO W/O SCOPE: CPT

## 2025-07-10 ENCOUNTER — TELEPHONE (OUTPATIENT)
Dept: FAMILY MEDICINE | Facility: CLINIC | Age: 35
End: 2025-07-10
Payer: COMMERCIAL

## 2025-07-14 ENCOUNTER — CLINICAL SUPPORT (OUTPATIENT)
Dept: FAMILY MEDICINE | Facility: CLINIC | Age: 35
End: 2025-07-14
Attending: NURSE PRACTITIONER
Payer: COMMERCIAL

## 2025-07-14 ENCOUNTER — OFFICE VISIT (OUTPATIENT)
Dept: FAMILY MEDICINE | Facility: CLINIC | Age: 35
End: 2025-07-14
Payer: COMMERCIAL

## 2025-07-14 VITALS
RESPIRATION RATE: 18 BRPM | DIASTOLIC BLOOD PRESSURE: 79 MMHG | SYSTOLIC BLOOD PRESSURE: 121 MMHG | BODY MASS INDEX: 35.16 KG/M2 | OXYGEN SATURATION: 100 % | WEIGHT: 224 LBS | TEMPERATURE: 98 F | HEIGHT: 67 IN | HEART RATE: 98 BPM

## 2025-07-14 DIAGNOSIS — Z11.3 SCREENING EXAMINATION FOR STI: ICD-10-CM

## 2025-07-14 DIAGNOSIS — D64.89 ANEMIA DUE TO OTHER CAUSE, NOT CLASSIFIED: ICD-10-CM

## 2025-07-14 DIAGNOSIS — E11.9 TYPE 2 DIABETES MELLITUS WITHOUT COMPLICATION, WITHOUT LONG-TERM CURRENT USE OF INSULIN: ICD-10-CM

## 2025-07-14 DIAGNOSIS — E55.9 VITAMIN D DEFICIENCY: Primary | ICD-10-CM

## 2025-07-14 DIAGNOSIS — F32.A ANXIETY AND DEPRESSION: ICD-10-CM

## 2025-07-14 DIAGNOSIS — F41.9 ANXIETY AND DEPRESSION: ICD-10-CM

## 2025-07-14 DIAGNOSIS — I10 PRIMARY HYPERTENSION: ICD-10-CM

## 2025-07-14 PROBLEM — L65.9 HAIR LOSS: Status: RESOLVED | Noted: 2023-02-22 | Resolved: 2025-07-14

## 2025-07-14 LAB
ALBUMIN/CREAT UR: ABNORMAL
BACTERIA #/AREA URNS AUTO: ABNORMAL /HPF
BILIRUB UR QL STRIP.AUTO: NEGATIVE
CLARITY UR: CLEAR
COLOR UR AUTO: ABNORMAL
CREAT UR-MCNC: 144.4 MG/DL (ref 45–106)
GLUCOSE UR QL STRIP: NORMAL
HAV IGM SERPL QL IA: NONREACTIVE
HBV CORE IGM SERPL QL IA: NONREACTIVE
HBV SURFACE AG SERPL QL IA: NONREACTIVE
HCV AB SERPL QL IA: NONREACTIVE
HGB UR QL STRIP: NEGATIVE
HIV 1+2 AB+HIV1 P24 AG SERPL QL IA: NONREACTIVE
HYALINE CASTS #/AREA URNS LPF: ABNORMAL /LPF
KETONES UR QL STRIP: NEGATIVE
LEUKOCYTE ESTERASE UR QL STRIP: NEGATIVE
MICROALBUMIN UR-MCNC: <5 UG/ML
MUCOUS THREADS URNS QL MICRO: ABNORMAL /LPF
NITRITE UR QL STRIP: NEGATIVE
PH UR STRIP: 5.5 [PH]
PROT UR QL STRIP: NEGATIVE
RBC #/AREA URNS AUTO: ABNORMAL /HPF
SP GR UR STRIP.AUTO: 1.02 (ref 1–1.03)
SQUAMOUS #/AREA URNS LPF: ABNORMAL /HPF
T PALLIDUM AB SER QL: NONREACTIVE
UROBILINOGEN UR STRIP-ACNC: NORMAL
WBC #/AREA URNS AUTO: ABNORMAL /HPF

## 2025-07-14 PROCEDURE — 99214 OFFICE O/P EST MOD 30 MIN: CPT | Mod: 25,S$PBB,, | Performed by: NURSE PRACTITIONER

## 2025-07-14 PROCEDURE — 82728 ASSAY OF FERRITIN: CPT | Performed by: NURSE PRACTITIONER

## 2025-07-14 PROCEDURE — 92228 IMG RTA DETC/MNTR DS PHY/QHP: CPT | Mod: TC,PBBFAC,PN | Performed by: FAMILY MEDICINE

## 2025-07-14 PROCEDURE — 99395 PREV VISIT EST AGE 18-39: CPT | Mod: S$PBB,,, | Performed by: NURSE PRACTITIONER

## 2025-07-14 PROCEDURE — 82607 VITAMIN B-12: CPT | Performed by: NURSE PRACTITIONER

## 2025-07-14 PROCEDURE — 92228 IMG RTA DETC/MNTR DS PHY/QHP: CPT | Mod: 26,TC,S$PBB, | Performed by: FAMILY MEDICINE

## 2025-07-14 PROCEDURE — 99215 OFFICE O/P EST HI 40 MIN: CPT | Mod: PBBFAC,PN | Performed by: NURSE PRACTITIONER

## 2025-07-14 PROCEDURE — 83540 ASSAY OF IRON: CPT | Performed by: NURSE PRACTITIONER

## 2025-07-14 PROCEDURE — 86780 TREPONEMA PALLIDUM: CPT | Performed by: NURSE PRACTITIONER

## 2025-07-14 PROCEDURE — 81015 MICROSCOPIC EXAM OF URINE: CPT | Performed by: NURSE PRACTITIONER

## 2025-07-14 PROCEDURE — 82570 ASSAY OF URINE CREATININE: CPT | Performed by: NURSE PRACTITIONER

## 2025-07-14 PROCEDURE — 87661 TRICHOMONAS VAGINALIS AMPLIF: CPT | Performed by: NURSE PRACTITIONER

## 2025-07-14 PROCEDURE — 87389 HIV-1 AG W/HIV-1&-2 AB AG IA: CPT | Performed by: NURSE PRACTITIONER

## 2025-07-14 PROCEDURE — 80074 ACUTE HEPATITIS PANEL: CPT | Performed by: NURSE PRACTITIONER

## 2025-07-14 RX ORDER — ALPRAZOLAM 1 MG/1
1 TABLET ORAL DAILY PRN
Qty: 30 TABLET | Refills: 1 | Status: SHIPPED | OUTPATIENT
Start: 2025-07-14

## 2025-07-14 RX ORDER — PHENTERMINE HYDROCHLORIDE 37.5 MG/1
37.5 TABLET ORAL DAILY
COMMUNITY
Start: 2025-05-29 | End: 2025-07-14

## 2025-07-14 RX ORDER — ASPIRIN 325 MG
50000 TABLET, DELAYED RELEASE (ENTERIC COATED) ORAL
COMMUNITY
Start: 2025-07-09 | End: 2025-07-14 | Stop reason: SDUPTHER

## 2025-07-14 RX ORDER — METOPROLOL SUCCINATE 25 MG/1
25 TABLET, EXTENDED RELEASE ORAL DAILY
Qty: 90 TABLET | Refills: 3 | Status: SHIPPED | OUTPATIENT
Start: 2025-07-14

## 2025-07-14 RX ORDER — FERROUS SULFATE 325(65) MG
325 TABLET ORAL 2 TIMES DAILY
COMMUNITY
Start: 2025-04-07

## 2025-07-14 RX ORDER — ASPIRIN 325 MG
50000 TABLET, DELAYED RELEASE (ENTERIC COATED) ORAL
Qty: 8 CAPSULE | Refills: 0 | Status: SHIPPED | OUTPATIENT
Start: 2025-07-14

## 2025-07-14 RX ORDER — ESCITALOPRAM OXALATE 20 MG/1
TABLET ORAL
Qty: 90 TABLET | Refills: 3 | Status: SHIPPED | OUTPATIENT
Start: 2025-07-14

## 2025-07-14 RX ORDER — METOPROLOL TARTRATE 25 MG/1
TABLET, FILM COATED ORAL
COMMUNITY
End: 2025-07-14

## 2025-07-14 RX ORDER — LISDEXAMFETAMINE DIMESYLATE 30 MG/1
30 CAPSULE ORAL EVERY MORNING
COMMUNITY
Start: 2025-05-07

## 2025-07-14 NOTE — PROGRESS NOTES
[facility]    Patient ID: [id number]     Chief Complaint: Establish Care (ECA. Establish care. Requesting refill requests. Wants to talk about medications she is on. States Ozempic is no longer effective. Requesting behavioral health referral. )      HPI:   HPI    Luh Chowdary is a 35 y.o. female here today for Establish Care (ECA. Establish care. Requesting refill requests. Wants to talk about medications she is on. States Ozempic is no longer effective. Requesting behavioral health referral. )      History of Present Illness    07/14/2025: Patient presents to clinic today to establish care with new provider in clinic and wellness visit. Chronic medical conditions include anxiety, depression, HTN, T2 DM, vitamin-D deficiency.  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    Patient was referred to mental health in 3/2025 but does not have an appt to date. She does need refills for medications. She was previously treated with vyvanse but is unsure of last time she took medication. She reports frequent (almost daily) use of xanax. Referral to  placed this visit.    She feels that ozempic is not working to help with weight loss but is controlling her CBG/A1c. Will not increase at this time, stressed diet and portion control.         Health Maintenance         Date Due Completion Date    HIV Screening Never done ---    Diabetic Eye Exam 04/14/2024 4/14/2023    Diabetes Urine Screening 02/16/2025 2/16/2024    Pneumococcal Vaccines (Age 0-49) (1 of 2 - PCV) 07/14/2026 (Originally 2/12/2008) ---    Influenza Vaccine (1) 09/01/2025 ---    Hemoglobin A1c 10/03/2025 4/3/2025    Lipid Panel 04/03/2026 4/3/2025    Foot Exam 07/14/2026 7/14/2025    Override on 7/14/2025: Done    Cervical Cancer Screening 09/30/2026 9/30/2021    TETANUS VACCINE 07/06/2030 7/6/2020    RSV Vaccine (Age 60+ and Pregnant patients) (1 - 1-dose 75+ series) 02/12/2064 ---            Past Medical  History:   Diagnosis Date    Anxiety     Depression     Hair loss 02/22/2023    TSH, T4 B12      Hypertension         Past Surgical History:   Procedure Laterality Date    SURGICAL REMOVAL OF BOTH KIDNEYS      TUBAL LIGATION          Social History     Tobacco Use    Smoking status: Never     Passive exposure: Never    Smokeless tobacco: Never   Substance and Sexual Activity    Alcohol use: Yes     Comment: Maybe once a month    Drug use: Never    Sexual activity: Yes     Partners: Male     Comment:         Current Outpatient Medications   Medication Instructions    ALPRAZolam (XANAX) 1 mg, Oral, Daily PRN    cholecalciferol (vitamin D3) 50,000 Units, Oral, Every 7 days    EScitalopram oxalate (LEXAPRO) 20 MG tablet One tablet daily    FeroSuL 325 mg, 2 times daily    lisdexamfetamine (VYVANSE) 30 mg, Every morning    lisinopriL-hydrochlorothiazide (PRINZIDE,ZESTORETIC) 10-12.5 mg per tablet 1 tablet, Oral, Daily    metoprolol succinate (TOPROL-XL) 25 mg, Oral, Daily    OZEMPIC 2 mg, Subcutaneous, Every 7 days       Review of patient's allergies indicates:   Allergen Reactions    Shellfish containing products Itching and Swelling        Patient Care Team:  Jazzy Melton FNP as PCP - General (Family Medicine)  Melia Cates RD, [facility] as Diabetes Educator (Diabetes)     Subjective:     Review of Systems   Constitutional:  Negative for appetite change, chills, diaphoresis and fever.   HENT:  Negative for ear pain, sinus pain and sore throat.    Eyes:  Negative for pain and visual disturbance.   Respiratory:  Negative for cough, shortness of breath and wheezing.    Cardiovascular:  Negative for chest pain, palpitations and leg swelling.   Gastrointestinal:  Negative for abdominal pain, blood in stool, diarrhea, nausea and vomiting.   Endocrine: Negative for cold intolerance.   Genitourinary:  Negative for difficulty urinating, dysuria, frequency and hematuria.   Musculoskeletal:  Negative for arthralgias,  "joint swelling and myalgias.   Skin:  Negative for color change and rash.   Allergic/Immunologic: Negative.    Neurological: Negative.  Negative for dizziness, syncope, light-headedness and numbness.   Hematological: Negative.    Psychiatric/Behavioral: Negative.  Negative for dysphoric mood and suicidal ideas. The patient is not nervous/anxious.    All other systems reviewed and are negative.      12 point review of systems conducted, negative except as stated in the history of present illness. See HPI for details.    Objective:     Visit Vitals  /79 (BP Location: Right arm, Patient Position: Sitting)   Pulse 98   Temp 98.1 °F (36.7 °C) (Oral)   Resp 18   Ht 5' 7" (1.702 m)   Wt 101.6 kg (224 lb)   LMP 06/27/2025   SpO2 100%   Breastfeeding No   BMI 35.08 kg/m²       Physical Exam  Vitals and nursing note reviewed.   Constitutional:       General: She is not in acute distress.     Appearance: She is obese. She is not ill-appearing.   HENT:      Head: Normocephalic and atraumatic.      Mouth/Throat:      Mouth: Mucous membranes are moist.      Pharynx: Oropharynx is clear.   Eyes:      General: No scleral icterus.     Extraocular Movements: Extraocular movements intact.      Conjunctiva/sclera: Conjunctivae normal.      Pupils: Pupils are equal, round, and reactive to light.   Neck:      Vascular: No carotid bruit.   Cardiovascular:      Rate and Rhythm: Normal rate and regular rhythm.      Pulses:           Dorsalis pedis pulses are 2+ on the right side and 2+ on the left side.        Posterior tibial pulses are 2+ on the right side and 2+ on the left side.      Heart sounds: No murmur heard.     No friction rub. No gallop.   Pulmonary:      Effort: Pulmonary effort is normal. No respiratory distress.      Breath sounds: Normal breath sounds. No wheezing, rhonchi or rales.   Abdominal:      General: Abdomen is flat. Bowel sounds are normal. There is no distension.      Palpations: Abdomen is soft. There is no " mass.      Tenderness: There is no abdominal tenderness.   Musculoskeletal:         General: Normal range of motion.      Cervical back: Normal range of motion and neck supple.      Right foot: No deformity.      Left foot: No deformity.   Feet:      Right foot:      Protective Sensation: 5 sites tested.  5 sites sensed.      Skin integrity: Skin integrity normal. No ulcer, blister, skin breakdown, erythema, warmth, callus, dry skin or fissure.      Toenail Condition: Right toenails are normal.      Left foot:      Protective Sensation: 5 sites tested.  5 sites sensed.      Skin integrity: Skin integrity normal. No ulcer, blister, skin breakdown, erythema, warmth, callus, dry skin or fissure.      Toenail Condition: Left toenails are normal.   Skin:     General: Skin is warm and dry.   Neurological:      General: No focal deficit present.      Mental Status: She is alert.   Psychiatric:         Mood and Affect: Mood normal.         Labs Reviewed:     Chemistry:  Lab Results   Component Value Date     04/03/2025    K 4.2 04/03/2025    BUN 12.0 04/03/2025    CREATININE 0.89 04/03/2025    EGFRNORACEVR >60 04/03/2025    CALCIUM 8.9 04/03/2025    ALKPHOS 81 04/03/2025    ALBUMIN 3.4 (L) 04/03/2025    BILIDIR 0.2 10/23/2020    IBILI 0.20 10/23/2020    AST 12 04/03/2025    ALT 11 04/03/2025    MG 2.00 08/22/2024    [id number] 18 (L) 04/03/2025    TSH 0.637 04/03/2025    JOOGYV2STFK 1.12 04/03/2025        Lab Results   Component Value Date    HGBA1C 5.3 04/03/2025        Hematology:  Lab Results   Component Value Date    WBC 5.08 04/03/2025    HGB 8.5 (L) 04/03/2025    HCT 30.1 (L) 04/03/2025     04/03/2025       Lipid Panel:  Lab Results   Component Value Date    CHOL 172 04/03/2025    HDL 34 (L) 04/03/2025    .00 04/03/2025    TRIG 65 04/03/2025    TOTALCHOLEST 5 04/03/2025        Urine:  Lab Results   Component Value Date    APPEARANCEUA Slightly Cloudy (A) 04/03/2025    SGUA 1.025 04/03/2025     PROTEINUA Trace (A) 04/03/2025    KETONESUA Negative 04/03/2025    LEUKOCYTESUR Negative 04/03/2025    RBCUA None Seen 04/03/2025    WBCUA 0-2 04/03/2025    BACTERIA Few (A) 04/03/2025    SQEPUA None Seen 08/22/2024    HYALINECASTS None Seen 08/22/2024    CREATRANDUR 335.5 (H) 02/16/2024        Assessment:       ICD-10-CM ICD-9-CM   1. Vitamin D deficiency  E55.9 268.9   2. Type 2 diabetes mellitus without complication, without long-term current use of insulin  E11.9 250.00   3. Anxiety and depression  F41.9 300.00    F32.A 311   4. Primary hypertension  I10 401.9   5. Screening examination for STI  Z11.3 V74.5   6. Anemia due to other cause, not classified  D64.89 285.8        Plan:     1. Vitamin D deficiency  Assessment & Plan:  - Started on Vit D for 1 month  - will provide an additional 2 months  - Increase foods high in Vitamin D such as fish oil, cod liver oil, salmon, milk fortified with vitamin D.  - Complete entire 12 weeks of Vitamin D prescription.  - After completion of prescription (12 weeks/3 months), begin taking Vitamin D 2,000 IU daily (purchase over the counter).    Orders:  -     cholecalciferol, vitamin D3, 1,250 mcg (50,000 unit) capsule; Take 1 capsule (50,000 Units total) by mouth every 7 days.  Dispense: 8 capsule; Refill: 0    2. Type 2 diabetes mellitus without complication, without long-term current use of insulin  Overview:  Lab Results   Component Value Date    HGBA1C 5.3 04/03/2025    HGBA1C 5.0 02/16/2024    HGBA1C 5.7 02/23/2023    OYLYURD0Y 5.1 10/17/2023       Assessment & Plan:  - Stable, chronic  - continue   Diabetes Medications       semaglutide (OZEMPIC) 2 mg/dose (8 mg/3 mL) PnIj Inject 2 mg into the skin every 7 days.     - On ACE according to guidelines.  - will discuss starting statin at follow up visit.  - Patient is aware of hypoglycemia signs and symptoms.   - Has hypoglycemia emergency plan and glucose medications such as tablets, paste on hand  - Discussed the  importance of A1C lab work, yearly Diabetic Eye Exams and daily self foot exams  - DIET: Avoid sugary drinks, processed food, limit carbohydrate intake, eat complex meals, portion control.  - EXERCISES: Advised if able, 150 min/week of moderate exercise (like brisk walking), performed during 3 to 5 sessions per week. Resistance exercise that uses the major muscle groups is recommended 2 to 3 times per week.?  - WEIGHT LOSS: 5-7% of body weight is recommended through calorie restriction, diet, and exercises with goal BMI <30.    Orders:  -     Microalbumin/Creatinine Ratio, Urine  -     Diabetic Eye Screening Photo; Future; Expected date: 07/14/2025  -     Urinalysis    3. Anxiety and depression  Assessment & Plan:  - Stable, chronic  - Continue lexapro and xanax as ordered  - Over the last two weeks how often have you been bothered by little interest or pleasure in doing things: 1  Over the last two weeks how often have you been bothered by feeling down, depressed or hopeless: 1  PHQ-2 Total Score: 2  - Stressed the importance of compliance of pharmacotherapy treatment and also participating in psychotherapy treatment as combination treatment is more effect than one treatment alone.   - Relaxation, positive activities, and exercise are important as an outlet.   - Monitor for common adverse effects of antidepressant medications including diarrhea, nausea and vomiting, sexual dysfunction, somnolence, and weight gain.  - Denies any SI / HI    Orders:  -     EScitalopram oxalate (LEXAPRO) 20 MG tablet; One tablet daily  Dispense: 90 tablet; Refill: 3  -     ALPRAZolam (XANAX) 1 MG tablet; Take 1 tablet (1 mg total) by mouth daily as needed for Anxiety.  Dispense: 30 tablet; Refill: 1    4. Primary hypertension  Assessment & Plan:  - Disease state: Stable, chronic  - BP: 121/79  - Continue  Hypertension Medications              lisinopriL-hydrochlorothiazide (PRINZIDE,ZESTORETIC) 10-12.5 mg per tablet Take 1 tablet by  mouth once daily.    metoprolol succinate (TOPROL-XL) 25 MG 24 hr tablet Take 1 tablet (25 mg total) by mouth once daily.     - Low Sodium Diet (Dash Diet - less than 2 grams of sodium per day).  - Maintain healthy weight with goal BMI <30. Exercise 30 minutes per day 5 days per week.  - Patient encouraged to monitor BP every day 2-3 hours after medications and record.  - Report to PCP if BP > 140/90 x3 days    Orders:  -     metoprolol succinate (TOPROL-XL) 25 MG 24 hr tablet; Take 1 tablet (25 mg total) by mouth once daily.  Dispense: 90 tablet; Refill: 3    5. Screening examination for STI  -     Hepatitis Panel, Acute  -     HIV 1/2 Ag/Ab (4th Gen)  -     SYPHILIS ANTIBODY (WITH REFLEX RPR)  -     Sexually-Transmitted Infections (STIs) Increased Risk Panel  -     Ambulatory referral/consult to Behavioral Health; Future; Expected date: 07/21/2025    6. Anemia due to other cause, not classified  Assessment & Plan:  - currently taking ferosul daily due to constipation, ordered bid  - encouraged to increase to bid and take stool softener  - iron studies ordered    Orders:  -     Iron and TIBC; Future; Expected date: 07/14/2025  -     Ferritin; Future; Expected date: 07/14/2025  -     Reticulocytes; Future; Expected date: 07/14/2025  -     Folate; Future; Expected date: 07/14/2025  -     Vitamin B12; Future; Expected date: 07/14/2025     Follow up in about 2 weeks (around 7/28/2025) for Virtual Visit. In addition to their scheduled follow up, the patient has also been instructed to follow up on as needed basis.     Future Appointments   Date Time Provider Department Center   7/14/2025  1:00 PM DIABETIC EYE [facility], [facility] FAMILY MEDICINE [facility]   7/18/2025 10:00 AM Jazzy Melton FNP [facility]   9/11/2025 10:20 AM Jessica Madison FNP [facility]

## 2025-07-14 NOTE — ASSESSMENT & PLAN NOTE
- currently taking ferosul daily due to constipation, ordered bid  - encouraged to increase to bid and take stool softener  - iron studies ordered

## 2025-07-14 NOTE — ASSESSMENT & PLAN NOTE
- Disease state: Stable, chronic  - BP: 121/79  - Continue  Hypertension Medications              lisinopriL-hydrochlorothiazide (PRINZIDE,ZESTORETIC) 10-12.5 mg per tablet Take 1 tablet by mouth once daily.    metoprolol succinate (TOPROL-XL) 25 MG 24 hr tablet Take 1 tablet (25 mg total) by mouth once daily.     - Low Sodium Diet (Dash Diet - less than 2 grams of sodium per day).  - Maintain healthy weight with goal BMI <30. Exercise 30 minutes per day 5 days per week.  - Patient encouraged to monitor BP every day 2-3 hours after medications and record.  - Report to PCP if BP > 140/90 x3 days

## 2025-07-14 NOTE — ASSESSMENT & PLAN NOTE
- Started on Vit D for 1 month  - will provide an additional 2 months  - Increase foods high in Vitamin D such as fish oil, cod liver oil, salmon, milk fortified with vitamin D.  - Complete entire 12 weeks of Vitamin D prescription.  - After completion of prescription (12 weeks/3 months), begin taking Vitamin D 2,000 IU daily (purchase over the counter).

## 2025-07-14 NOTE — ASSESSMENT & PLAN NOTE
- Stable, chronic  - Continue lexapro and xanax as ordered  - Over the last two weeks how often have you been bothered by little interest or pleasure in doing things: 1  Over the last two weeks how often have you been bothered by feeling down, depressed or hopeless: 1  PHQ-2 Total Score: 2  - Stressed the importance of compliance of pharmacotherapy treatment and also participating in psychotherapy treatment as combination treatment is more effect than one treatment alone.   - Relaxation, positive activities, and exercise are important as an outlet.   - Monitor for common adverse effects of antidepressant medications including diarrhea, nausea and vomiting, sexual dysfunction, somnolence, and weight gain.  - Denies any SI / HI

## 2025-07-14 NOTE — ASSESSMENT & PLAN NOTE
- Stable, chronic  - continue   Diabetes Medications       semaglutide (OZEMPIC) 2 mg/dose (8 mg/3 mL) PnIj Inject 2 mg into the skin every 7 days.     - On ACE according to guidelines.  - will discuss starting statin at follow up visit.  - Patient is aware of hypoglycemia signs and symptoms.   - Has hypoglycemia emergency plan and glucose medications such as tablets, paste on hand  - Discussed the importance of A1C lab work, yearly Diabetic Eye Exams and daily self foot exams  - DIET: Avoid sugary drinks, processed food, limit carbohydrate intake, eat complex meals, portion control.  - EXERCISES: Advised if able, 150 min/week of moderate exercise (like brisk walking), performed during 3 to 5 sessions per week. Resistance exercise that uses the major muscle groups is recommended 2 to 3 times per week.?  - WEIGHT LOSS: 5-7% of body weight is recommended through calorie restriction, diet, and exercises with goal BMI <30.

## 2025-07-14 NOTE — PROGRESS NOTES
Luh Chowdary is a 35 y.o. female here for a diabetic eye screening with non-dilated fundus photos per TYLER Camacho.    Patient cooperative?: Yes  Small pupils?: No  Last eye exam:     For exam results, see Encounter Report.

## 2025-07-15 ENCOUNTER — TELEPHONE (OUTPATIENT)
Dept: FAMILY MEDICINE | Facility: CLINIC | Age: 35
End: 2025-07-15
Payer: COMMERCIAL

## 2025-07-15 LAB
C TRACH RRNA UR QL NAA+PROBE: NOT DETECTED
FERRITIN SERPL-MCNC: 5.94 NG/ML (ref 4.63–204)
IRON SATN MFR SERPL: 4 % (ref 20–50)
IRON SERPL-MCNC: 15 UG/DL (ref 50–170)
N GONORRHOEA DNA UR QL NAA+PROBE: NOT DETECTED
T VAGINALIS RRNA UR QL NAA+PROBE: NOT DETECTED
TIBC SERPL-MCNC: 361 UG/DL (ref 70–310)
TIBC SERPL-MCNC: 376 UG/DL (ref 250–450)
TRANSFERRIN SERPL-MCNC: 345 MG/DL (ref 180–382)
VIT B12 SERPL-MCNC: 575 PG/ML (ref 213–816)

## 2025-07-15 NOTE — TELEPHONE ENCOUNTER
Copied from CRM #[id number]. Topic: General Inquiry - Return Call  >> Jul 14, 2025  2:25 PM Deb wrote:  Pt returning a call back to Jessica

## 2025-07-22 ENCOUNTER — TELEPHONE (OUTPATIENT)
Dept: FAMILY MEDICINE | Facility: CLINIC | Age: 35
End: 2025-07-22
Payer: COMMERCIAL

## 2025-07-24 ENCOUNTER — OFFICE VISIT (OUTPATIENT)
Dept: FAMILY MEDICINE | Facility: CLINIC | Age: 35
End: 2025-07-24
Payer: COMMERCIAL

## 2025-07-24 ENCOUNTER — TELEPHONE (OUTPATIENT)
Dept: FAMILY MEDICINE | Facility: CLINIC | Age: 35
End: 2025-07-24

## 2025-07-24 DIAGNOSIS — E11.9 TYPE 2 DIABETES MELLITUS WITHOUT COMPLICATION, WITHOUT LONG-TERM CURRENT USE OF INSULIN: Primary | ICD-10-CM

## 2025-07-24 DIAGNOSIS — D50.0 IRON DEFICIENCY ANEMIA DUE TO CHRONIC BLOOD LOSS: ICD-10-CM

## 2025-07-24 RX ORDER — ROSUVASTATIN CALCIUM 10 MG/1
10 TABLET, COATED ORAL DAILY
Qty: 90 TABLET | Refills: 3 | Status: SHIPPED | OUTPATIENT
Start: 2025-07-24 | End: 2026-07-24

## 2025-07-24 NOTE — TELEPHONE ENCOUNTER
----- Message from TYLER Camacho sent at 7/24/2025  1:24 PM CDT -----  Cancel appt with Jessica Madison

## 2025-07-24 NOTE — PROGRESS NOTES
[facility]        Patient ID: [id number]     Chief Complaint: Follow-up      HPI:     This is a telemedicine note. Patient was treated using telemedicine, real time audio and video, according to [facility] protocols. Jazzy NINA FNP-C, conducted the visit from the [facility]. The patient participated in the visit at a non-[facility] location selected by the patient, identified below. I am licensed in the state where the patient stated they are located. The patient stated that they understood and accepted the privacy and security risks to their information at their location. This visit is not recorded.    Patient was located at the patient's home.        Luh Chowdary is a 35 y.o. female here today for a telemedicine visit.     07/14/2025: Patient presents to clinic today to establish care with new provider in clinic and wellness visit. Chronic medical conditions include anxiety, depression, HTN, T2 DM, vitamin-D deficiency.  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     Patient was referred to mental health in 3/2025 but does not have an appt to date. She does need refills for medications. She was previously treated with vyvanse but is unsure of last time she took medication. She reports frequent (almost daily) use of xanax. Referral to  placed this visit.     She feels that ozempic is not working to help with weight loss but is controlling her CBG/A1c. Will not increase at this time, stressed diet and portion control.    07/24/2025: Patient presents to clinic today via telehealth for follow up regarding lab results and anxiety.  Patient was referred to behavioral health at [facility].  Referral was accepted an appointment has been scheduled.  Patient was not taking iron supplement twice daily as ordered.  Iron panel obtained at [facility] and revealed need to continue iron supplement.  Patient is taking over-the-counter stool softener with some relief constipation.   Patient does express heavy menstrual cycles.  She has regular and cycle last approximately 7-8 days.  This is an chronic issue.  Previously offered Depo-Provera to help control cycles but declined.  Encouraged the patient to reestablish care with gyn for further evaluation on AUB, verbalized she would.  Discussed need for statin with patient due to diabetes.  Agreed to start low-dose statin.        Health Maintenance         Date Due Completion Date    Pneumococcal Vaccines (Age 0-49) (1 of 2 - PCV) 07/14/2026 (Originally 2/12/2008) ---    Influenza Vaccine (1) 09/01/2025 ---    Hemoglobin A1c 10/03/2025 4/3/2025    Lipid Panel 04/03/2026 4/3/2025    Diabetes Urine Screening 07/14/2026 7/14/2025    Foot Exam 07/14/2026 7/14/2025    Override on 7/14/2025: Done    Diabetic Eye Exam 07/14/2026 7/14/2025    Cervical Cancer Screening 09/30/2026 9/30/2021    TETANUS VACCINE 07/06/2030 7/6/2020    RSV Vaccine (Age 60+ and Pregnant patients) (1 - 1-dose 75+ series) 02/12/2064 ---            Past Medical History:   Diagnosis Date    Anxiety     Depression     Hair loss 02/22/2023    TSH, T4 B12      Hypertension         Past Surgical History:   Procedure Laterality Date    SURGICAL REMOVAL OF BOTH KIDNEYS      TUBAL LIGATION          Social History     Tobacco Use    Smoking status: Never     Passive exposure: Never    Smokeless tobacco: Never   Substance and Sexual Activity    Alcohol use: Yes     Comment: Maybe once a month    Drug use: Never    Sexual activity: Yes     Partners: Male     Comment:         Current Outpatient Medications   Medication Instructions    ALPRAZolam (XANAX) 1 mg, Oral, Daily PRN    cholecalciferol (vitamin D3) 50,000 Units, Oral, Every 7 days    EScitalopram oxalate (LEXAPRO) 20 MG tablet One tablet daily    FeroSuL 325 mg, 2 times daily    lisdexamfetamine (VYVANSE) 30 mg, Every morning    lisinopriL-hydrochlorothiazide (PRINZIDE,ZESTORETIC) 10-12.5 mg per tablet 1 tablet, Oral, Daily     metoprolol succinate (TOPROL-XL) 25 mg, Oral, Daily    OZEMPIC 2 mg, Subcutaneous, Every 7 days    rosuvastatin (CRESTOR) 10 mg, Oral, Daily       Review of patient's allergies indicates:   Allergen Reactions    Shellfish containing products Itching and Swelling        Patient Care Team:  Jazzy Melton FNP as PCP - General (Family Medicine)  Melia Cates RD, [facility] as [facility] Educator (Diabetes)     Subjective:     Review of Systems   Constitutional:  Negative for activity change, appetite change, chills, diaphoresis, fever and unexpected weight change.   HENT:  Negative for ear pain, hearing loss, rhinorrhea, sinus pain, sore throat and trouble swallowing.    Eyes:  Negative for pain, discharge and visual disturbance.   Respiratory:  Negative for cough, chest tightness, shortness of breath and wheezing.    Cardiovascular:  Negative for chest pain, palpitations and leg swelling.   Gastrointestinal:  Positive for constipation. Negative for abdominal pain, blood in stool, diarrhea, nausea and vomiting.   Endocrine: Negative for cold intolerance, polydipsia and polyuria.   Genitourinary:  Negative for difficulty urinating, dysuria, frequency, hematuria and menstrual problem.   Musculoskeletal:  Negative for arthralgias, joint swelling, myalgias and neck pain.   Skin:  Negative for color change and rash.   Allergic/Immunologic: Negative.    Neurological: Negative.  Negative for dizziness, syncope, weakness, light-headedness, numbness and headaches.   Hematological: Negative.    Psychiatric/Behavioral: Negative.  Negative for confusion, dysphoric mood and suicidal ideas. The patient is not nervous/anxious.    All other systems reviewed and are negative.      12 point review of systems conducted, negative except as stated in the history of present illness. See HPI for details.    Objective:     Visit Vitals  LMP 06/27/2025       Physical Exam  Constitutional:       Appearance: Normal appearance.   HENT:      Head:  Normocephalic and atraumatic.   Eyes:      Extraocular Movements: Extraocular movements intact.   Pulmonary:      Effort: Pulmonary effort is normal.   Neurological:      Mental Status: She is alert and oriented to person, place, and time. Mental status is at baseline.   Psychiatric:         Mood and Affect: Mood normal.         Thought Content: Thought content normal.         Assessment:       ICD-10-CM ICD-9-CM   1. Type 2 diabetes mellitus without complication, without long-term current use of insulin  E11.9 250.00   2. Iron deficiency anemia due to chronic blood loss  D50.0 280.0        Labs Reviewed:     Chemistry:  Lab Results   Component Value Date     04/03/2025    K 4.2 04/03/2025    BUN 12.0 04/03/2025    CREATININE 0.89 04/03/2025    EGFRNORACEVR >60 04/03/2025    CALCIUM 8.9 04/03/2025    ALKPHOS 81 04/03/2025    ALBUMIN 3.4 (L) 04/03/2025    BILIDIR 0.2 10/23/2020    IBILI 0.20 10/23/2020    AST 12 04/03/2025    ALT 11 04/03/2025    MG 2.00 08/22/2024    ZVWVBDZS27SJ 18 (L) 04/03/2025    TSH 0.637 04/03/2025    NTLJXT9XRNY 1.12 04/03/2025        Lab Results   Component Value Date    HGBA1C 5.3 04/03/2025        Hematology:  Lab Results   Component Value Date    WBC 5.08 04/03/2025    HGB 8.5 (L) 04/03/2025    HCT 30.1 (L) 04/03/2025     04/03/2025       Lipid Panel:  Lab Results   Component Value Date    CHOL 172 04/03/2025    HDL 34 (L) 04/03/2025    .00 04/03/2025    TRIG 65 04/03/2025    TOTALCHOLEST 5 04/03/2025        Urine:  Lab Results   Component Value Date    APPEARANCEUA Clear 07/14/2025    SGUA 1.020 07/14/2025    PROTEINUA Negative 07/14/2025    KETONESUA Negative 07/14/2025    LEUKOCYTESUR Negative 07/14/2025    RBCUA 0-5 07/14/2025    WBCUA 0-5 07/14/2025    BACTERIA None Seen 07/14/2025    SQEPUA Occasional (A) 07/14/2025    HYALINECASTS None Seen 07/14/2025    CREATRANDUR 144.4 (H) 07/14/2025        Plan:     1. Type 2 diabetes mellitus without complication, without  long-term current use of insulin  Overview:  Lab Results   Component Value Date    HGBA1C 5.3 04/03/2025    HGBA1C 5.0 02/16/2024    HGBA1C 5.7 02/23/2023    BLAUCHS8Y 5.1 10/17/2023       Assessment & Plan:  - Stable, chronic  - continue   Diabetes Medications       semaglutide (OZEMPIC) 2 mg/dose (8 mg/3 mL) PnIj Inject 2 mg into the skin every 7 days.     - On ACE according to guidelines.  - start rosuvastatin  - Patient is aware of hypoglycemia signs and symptoms.   - Has hypoglycemia emergency plan and glucose medications such as tablets, paste on hand  - Discussed the importance of A1C lab work, yearly Diabetic Eye Exams and daily self foot exams  - DIET: Avoid sugary drinks, processed food, limit carbohydrate intake, eat complex meals, portion control.  - EXERCISES: Advised if able, 150 min/week of moderate exercise (like brisk walking), performed during 3 to 5 sessions per week. Resistance exercise that uses the major muscle groups is recommended 2 to 3 times per week.?  - WEIGHT LOSS: 5-7% of body weight is recommended through calorie restriction, diet, and exercises with goal BMI <30.    Orders:  -     rosuvastatin (CRESTOR) 10 MG tablet; Take 1 tablet (10 mg total) by mouth once daily.  Dispense: 90 tablet; Refill: 3  -     Comprehensive Metabolic Panel; Future; Expected date: 10/24/2025  -     Lipid Panel; Future; Expected date: 10/24/2025  -     Hemoglobin A1C; Future; Expected date: 10/24/2025    2. Iron deficiency anemia due to chronic blood loss  Assessment & Plan:  - ferosul BID  - continue OTC stool softener  - schedule appointment with gyn as discussed    Orders:  -     CBC Auto Differential; Future; Expected date: 10/24/2025    Follow up in about 3 months (around 10/24/2025). In addition to their scheduled follow up, the patient has also been instructed to follow up on as needed basis.     Future Appointments   Date Time Provider Department Center   10/1/2025  9:00 AM Rosalia Minor APRN  [facility]        Video Time Documentation:  This encounter lasted 20 minutes. More than 50% of this time was spent in counseling and coordination of care. This includes face to face time and non-face to face time preparing to see the patient (eg, review of tests), obtaining and/or reviewing separately obtained history, documenting clinical information in the electronic or other health record, independently interpreting results and communicating results to the patient/family/caregiver, or care coordinator.        TYLER HERNÁNDEZ

## 2025-07-24 NOTE — ASSESSMENT & PLAN NOTE
- Stable, chronic  - continue   Diabetes Medications       semaglutide (OZEMPIC) 2 mg/dose (8 mg/3 mL) PnIj Inject 2 mg into the skin every 7 days.     - On ACE according to guidelines.  - start rosuvastatin  - Patient is aware of hypoglycemia signs and symptoms.   - Has hypoglycemia emergency plan and glucose medications such as tablets, paste on hand  - Discussed the importance of A1C lab work, yearly Diabetic Eye Exams and daily self foot exams  - DIET: Avoid sugary drinks, processed food, limit carbohydrate intake, eat complex meals, portion control.  - EXERCISES: Advised if able, 150 min/week of moderate exercise (like brisk walking), performed during 3 to 5 sessions per week. Resistance exercise that uses the major muscle groups is recommended 2 to 3 times per week.?  - WEIGHT LOSS: 5-7% of body weight is recommended through calorie restriction, diet, and exercises with goal BMI <30.

## 2025-08-04 DIAGNOSIS — E11.9 TYPE 2 DIABETES MELLITUS WITHOUT COMPLICATION, WITHOUT LONG-TERM CURRENT USE OF INSULIN: Primary | ICD-10-CM

## 2025-08-04 RX ORDER — ATORVASTATIN CALCIUM 10 MG/1
10 TABLET, FILM COATED ORAL DAILY
Qty: 90 TABLET | Refills: 3 | Status: SHIPPED | OUTPATIENT
Start: 2025-08-04 | End: 2026-08-04

## 2025-08-05 ENCOUNTER — TELEPHONE (OUTPATIENT)
Dept: FAMILY MEDICINE | Facility: CLINIC | Age: 35
End: 2025-08-05
Payer: COMMERCIAL

## 2025-08-05 NOTE — TELEPHONE ENCOUNTER
Copied from CRM #[id number]. Topic: General Inquiry - Return Call  >> Aug 5, 2025 10:17 AM Marry wrote:  Who Called: Luh Chowdary    Patient is returning phone call    Who Left Message for Patient:unknown  Does the patient know what this is regarding?:      Preferred Method of Contact: Phone Call  Patient's Preferred Phone Number on File: [phone]   Best Call Back Number, if different:  Additional Information: Patient states she has a missed call from clinic.

## 2025-08-05 NOTE — TELEPHONE ENCOUNTER
Spoke to patient informed her that her rovastatin was denied coverage from her insurance and that her provider has prescribed her atorvastatin; which is covered by her insurance.